# Patient Record
Sex: FEMALE | Race: WHITE | Employment: OTHER | URBAN - METROPOLITAN AREA
[De-identification: names, ages, dates, MRNs, and addresses within clinical notes are randomized per-mention and may not be internally consistent; named-entity substitution may affect disease eponyms.]

---

## 2017-01-01 ENCOUNTER — PATIENT OUTREACH (OUTPATIENT)
Dept: CASE MANAGEMENT | Age: 82
End: 2017-01-01

## 2017-01-01 ENCOUNTER — APPOINTMENT (OUTPATIENT)
Dept: GENERAL RADIOLOGY | Age: 82
End: 2017-01-01
Attending: EMERGENCY MEDICINE
Payer: MEDICARE

## 2017-01-01 ENCOUNTER — HOSPITAL ENCOUNTER (EMERGENCY)
Age: 82
Discharge: HOME | End: 2017-01-01
Attending: EMERGENCY MEDICINE
Payer: MEDICARE

## 2017-01-01 ENCOUNTER — APPOINTMENT (OUTPATIENT)
Dept: GENERAL RADIOLOGY | Age: 82
DRG: 189 | End: 2017-01-01
Attending: INTERNAL MEDICINE
Payer: MEDICARE

## 2017-01-01 ENCOUNTER — HOSPITAL ENCOUNTER (INPATIENT)
Age: 82
LOS: 4 days | Discharge: NURSING, REHAB OR LONG-TERM CARE | DRG: 189 | End: 2017-01-14
Attending: INTERNAL MEDICINE | Admitting: INTERNAL MEDICINE
Payer: MEDICARE

## 2017-01-01 VITALS
HEIGHT: 68 IN | OXYGEN SATURATION: 100 % | SYSTOLIC BLOOD PRESSURE: 126 MMHG | HEART RATE: 90 BPM | BODY MASS INDEX: 17.43 KG/M2 | DIASTOLIC BLOOD PRESSURE: 56 MMHG | TEMPERATURE: 97.5 F | RESPIRATION RATE: 20 BRPM | WEIGHT: 115 LBS

## 2017-01-01 VITALS
WEIGHT: 117.9 LBS | BODY MASS INDEX: 19.03 KG/M2 | TEMPERATURE: 98.6 F | RESPIRATION RATE: 18 BRPM | HEART RATE: 93 BPM | OXYGEN SATURATION: 97 % | SYSTOLIC BLOOD PRESSURE: 127 MMHG | DIASTOLIC BLOOD PRESSURE: 66 MMHG

## 2017-01-01 DIAGNOSIS — J43.9 PULMONARY EMPHYSEMA, UNSPECIFIED EMPHYSEMA TYPE (HCC): ICD-10-CM

## 2017-01-01 DIAGNOSIS — R09.02 HYPOXIA: ICD-10-CM

## 2017-01-01 DIAGNOSIS — R06.02 SOB (SHORTNESS OF BREATH): Primary | ICD-10-CM

## 2017-01-01 DIAGNOSIS — J96.21 ACUTE ON CHRONIC RESPIRATORY FAILURE WITH HYPOXEMIA (HCC): ICD-10-CM

## 2017-01-01 DIAGNOSIS — R06.02 SHORTNESS OF BREATH: ICD-10-CM

## 2017-01-01 DIAGNOSIS — F32.A DEPRESSION, UNSPECIFIED DEPRESSION TYPE: Chronic | ICD-10-CM

## 2017-01-01 DIAGNOSIS — J44.1 COPD EXACERBATION (HCC): ICD-10-CM

## 2017-01-01 DIAGNOSIS — R53.81 DEBILITY: ICD-10-CM

## 2017-01-01 DIAGNOSIS — Z87.891 PERSONAL HISTORY OF TOBACCO USE: Chronic | ICD-10-CM

## 2017-01-01 DIAGNOSIS — G47.00 INSOMNIA, UNSPECIFIED TYPE: ICD-10-CM

## 2017-01-01 LAB
ALBUMIN SERPL BCP-MCNC: 3.5 G/DL (ref 3.2–4.6)
ALBUMIN/GLOB SERPL: 0.9 {RATIO} (ref 1.2–3.5)
ALP SERPL-CCNC: 67 U/L (ref 50–136)
ALT SERPL-CCNC: 26 U/L (ref 12–65)
ANION GAP BLD CALC-SCNC: 5 MMOL/L (ref 7–16)
ANION GAP BLD CALC-SCNC: 6 MMOL/L (ref 7–16)
ANION GAP BLD CALC-SCNC: 8 MMOL/L (ref 7–16)
ANION GAP BLD CALC-SCNC: 8 MMOL/L (ref 7–16)
ARTERIAL PATENCY WRIST A: POSITIVE
AST SERPL W P-5'-P-CCNC: 22 U/L (ref 15–37)
ATRIAL RATE: 70 BPM
BACTERIA SPEC CULT: NORMAL
BASE EXCESS BLDA CALC-SCNC: 5.7 MMOL/L (ref 0–3)
BASOPHILS # BLD AUTO: 0 K/UL (ref 0–0.2)
BASOPHILS # BLD AUTO: 0 K/UL (ref 0–0.2)
BASOPHILS # BLD: 0 % (ref 0–2)
BASOPHILS # BLD: 0 % (ref 0–2)
BDY SITE: ABNORMAL
BILIRUB SERPL-MCNC: 0.3 MG/DL (ref 0.2–1.1)
BNP SERPL-MCNC: 112 PG/ML
BUN SERPL-MCNC: 19 MG/DL (ref 8–23)
BUN SERPL-MCNC: 19 MG/DL (ref 8–23)
BUN SERPL-MCNC: 21 MG/DL (ref 8–23)
BUN SERPL-MCNC: 31 MG/DL (ref 8–23)
CALCIUM SERPL-MCNC: 8.9 MG/DL (ref 8.3–10.4)
CALCIUM SERPL-MCNC: 9.1 MG/DL (ref 8.3–10.4)
CALCIUM SERPL-MCNC: 9.2 MG/DL (ref 8.3–10.4)
CALCIUM SERPL-MCNC: 9.6 MG/DL (ref 8.3–10.4)
CALCULATED P AXIS, ECG09: 47 DEGREES
CALCULATED R AXIS, ECG10: 82 DEGREES
CALCULATED T AXIS, ECG11: 73 DEGREES
CHLORIDE SERPL-SCNC: 105 MMOL/L (ref 98–107)
CHLORIDE SERPL-SCNC: 95 MMOL/L (ref 98–107)
CHLORIDE SERPL-SCNC: 98 MMOL/L (ref 98–107)
CHLORIDE SERPL-SCNC: 98 MMOL/L (ref 98–107)
CO2 SERPL-SCNC: 36 MMOL/L (ref 21–32)
CO2 SERPL-SCNC: 36 MMOL/L (ref 21–32)
CO2 SERPL-SCNC: 40 MMOL/L (ref 21–32)
CO2 SERPL-SCNC: 40 MMOL/L (ref 21–32)
COHGB MFR BLD: 0.3 % (ref 0.5–1.5)
CREAT SERPL-MCNC: 0.81 MG/DL (ref 0.6–1)
CREAT SERPL-MCNC: 0.88 MG/DL (ref 0.6–1)
CREAT SERPL-MCNC: 1.14 MG/DL (ref 0.6–1)
CREAT SERPL-MCNC: 1.15 MG/DL (ref 0.6–1)
D DIMER PPP FEU-MCNC: 0.51 UG/ML(FEU)
DIAGNOSIS, 93000: NORMAL
DIASTOLIC BP, ECG02: NORMAL MMHG
DIFFERENTIAL METHOD BLD: ABNORMAL
DIFFERENTIAL METHOD BLD: ABNORMAL
DO-HGB BLD-MCNC: 3 % (ref 0–5)
EOSINOPHIL # BLD: 0 K/UL (ref 0–0.8)
EOSINOPHIL # BLD: 0 K/UL (ref 0–0.8)
EOSINOPHIL NFR BLD: 0 % (ref 0.5–7.8)
EOSINOPHIL NFR BLD: 0 % (ref 0.5–7.8)
ERYTHROCYTE [DISTWIDTH] IN BLOOD BY AUTOMATED COUNT: 11.8 % (ref 11.9–14.6)
ERYTHROCYTE [DISTWIDTH] IN BLOOD BY AUTOMATED COUNT: 12.4 % (ref 11.9–14.6)
FLUAV AG NPH QL IA: NEGATIVE
FLUBV AG NPH QL IA: NEGATIVE
GAS FLOW.O2 O2 DELIVERY SYS: 2 L/MIN
GLOBULIN SER CALC-MCNC: 3.7 G/DL (ref 2.3–3.5)
GLUCOSE SERPL-MCNC: 118 MG/DL (ref 65–100)
GLUCOSE SERPL-MCNC: 122 MG/DL (ref 65–100)
GLUCOSE SERPL-MCNC: 123 MG/DL (ref 65–100)
GLUCOSE SERPL-MCNC: 137 MG/DL (ref 65–100)
HCO3 BLDA-SCNC: 32 MMOL/L (ref 22–26)
HCT VFR BLD AUTO: 39.7 % (ref 35.8–46.3)
HCT VFR BLD AUTO: 42.8 % (ref 35.8–46.3)
HGB BLD-MCNC: 13.1 G/DL (ref 11.7–15.4)
HGB BLD-MCNC: 14.4 G/DL (ref 11.7–15.4)
HGB BLDMV-MCNC: 12.3 GM/DL (ref 11.7–15)
IMM GRANULOCYTES # BLD: 0 K/UL (ref 0–0.5)
IMM GRANULOCYTES # BLD: 0 K/UL (ref 0–0.5)
IMM GRANULOCYTES NFR BLD AUTO: 0.2 % (ref 0–5)
IMM GRANULOCYTES NFR BLD AUTO: 0.2 % (ref 0–5)
LYMPHOCYTES # BLD AUTO: 11 % (ref 13–44)
LYMPHOCYTES # BLD AUTO: 8 % (ref 13–44)
LYMPHOCYTES # BLD: 0.6 K/UL (ref 0.5–4.6)
LYMPHOCYTES # BLD: 1 K/UL (ref 0.5–4.6)
MAGNESIUM SERPL-MCNC: 2.1 MG/DL (ref 1.8–2.4)
MAGNESIUM SERPL-MCNC: 2.3 MG/DL (ref 1.8–2.4)
MCH RBC QN AUTO: 34 PG (ref 26.1–32.9)
MCH RBC QN AUTO: 34.2 PG (ref 26.1–32.9)
MCHC RBC AUTO-ENTMCNC: 33 G/DL (ref 31.4–35)
MCHC RBC AUTO-ENTMCNC: 33.6 G/DL (ref 31.4–35)
MCV RBC AUTO: 100.9 FL (ref 79.6–97.8)
MCV RBC AUTO: 103.7 FL (ref 79.6–97.8)
METHGB MFR BLD: 0.3 % (ref 0–1.5)
MM INDURATION POC: NORMAL MM (ref 0–5)
MONOCYTES # BLD: 0.4 K/UL (ref 0.1–1.3)
MONOCYTES # BLD: 0.5 K/UL (ref 0.1–1.3)
MONOCYTES NFR BLD AUTO: 4 % (ref 4–12)
MONOCYTES NFR BLD AUTO: 6 % (ref 4–12)
NEUTS SEG # BLD: 7.2 K/UL (ref 1.7–8.2)
NEUTS SEG # BLD: 7.7 K/UL (ref 1.7–8.2)
NEUTS SEG NFR BLD AUTO: 85 % (ref 43–78)
NEUTS SEG NFR BLD AUTO: 86 % (ref 43–78)
OXYHGB MFR BLDA: 96.7 % (ref 94–97)
P-R INTERVAL, ECG05: 174 MS
PCO2 BLDA: 52 MMHG (ref 35–45)
PH BLDA: 7.41 [PH] (ref 7.35–7.45)
PLATELET # BLD AUTO: 211 K/UL (ref 150–450)
PLATELET # BLD AUTO: 240 K/UL (ref 150–450)
PMV BLD AUTO: 10 FL (ref 10.8–14.1)
PMV BLD AUTO: 10.1 FL (ref 10.8–14.1)
PO2 BLDA: 107 MMHG (ref 75–100)
POTASSIUM SERPL-SCNC: 3 MMOL/L (ref 3.5–5.1)
POTASSIUM SERPL-SCNC: 3.1 MMOL/L (ref 3.5–5.1)
POTASSIUM SERPL-SCNC: 3.6 MMOL/L (ref 3.5–5.1)
POTASSIUM SERPL-SCNC: 3.7 MMOL/L (ref 3.5–5.1)
PPD POC: NEGATIVE NEGATIVE
PROT SERPL-MCNC: 7.2 G/DL (ref 6.3–8.2)
Q-T INTERVAL, ECG07: 420 MS
QRS DURATION, ECG06: 76 MS
QTC CALCULATION (BEZET), ECG08: 453 MS
RBC # BLD AUTO: 3.83 M/UL (ref 4.05–5.25)
RBC # BLD AUTO: 4.24 M/UL (ref 4.05–5.25)
SAO2 % BLD: 97 % (ref 92–98.5)
SERVICE CMNT-IMP: NORMAL
SODIUM SERPL-SCNC: 142 MMOL/L (ref 136–145)
SODIUM SERPL-SCNC: 143 MMOL/L (ref 136–145)
SODIUM SERPL-SCNC: 143 MMOL/L (ref 136–145)
SODIUM SERPL-SCNC: 147 MMOL/L (ref 136–145)
SYSTOLIC BP, ECG01: NORMAL MMHG
TROPONIN I SERPL-MCNC: <0.02 NG/ML (ref 0.02–0.05)
VENTILATION MODE VENT: ABNORMAL
VENTRICULAR RATE, ECG03: 70 BPM
WBC # BLD AUTO: 8.5 K/UL (ref 4.3–11.1)
WBC # BLD AUTO: 9.2 K/UL (ref 4.3–11.1)

## 2017-01-01 PROCEDURE — 94640 AIRWAY INHALATION TREATMENT: CPT

## 2017-01-01 PROCEDURE — 83880 ASSAY OF NATRIURETIC PEPTIDE: CPT | Performed by: EMERGENCY MEDICINE

## 2017-01-01 PROCEDURE — 74011250637 HC RX REV CODE- 250/637: Performed by: INTERNAL MEDICINE

## 2017-01-01 PROCEDURE — 77010033711 HC HIGH FLOW OXYGEN

## 2017-01-01 PROCEDURE — 71020 XR CHEST PA LAT: CPT

## 2017-01-01 PROCEDURE — 94760 N-INVAS EAR/PLS OXIMETRY 1: CPT

## 2017-01-01 PROCEDURE — 93005 ELECTROCARDIOGRAM TRACING: CPT | Performed by: EMERGENCY MEDICINE

## 2017-01-01 PROCEDURE — 74011000250 HC RX REV CODE- 250: Performed by: INTERNAL MEDICINE

## 2017-01-01 PROCEDURE — 97166 OT EVAL MOD COMPLEX 45 MIN: CPT

## 2017-01-01 PROCEDURE — 99239 HOSP IP/OBS DSCHRG MGMT >30: CPT | Performed by: INTERNAL MEDICINE

## 2017-01-01 PROCEDURE — 86580 TB INTRADERMAL TEST: CPT | Performed by: INTERNAL MEDICINE

## 2017-01-01 PROCEDURE — 85025 COMPLETE CBC W/AUTO DIFF WBC: CPT | Performed by: EMERGENCY MEDICINE

## 2017-01-01 PROCEDURE — 87804 INFLUENZA ASSAY W/OPTIC: CPT | Performed by: EMERGENCY MEDICINE

## 2017-01-01 PROCEDURE — 77010033678 HC OXYGEN DAILY

## 2017-01-01 PROCEDURE — 74011250636 HC RX REV CODE- 250/636: Performed by: INTERNAL MEDICINE

## 2017-01-01 PROCEDURE — 99232 SBSQ HOSP IP/OBS MODERATE 35: CPT | Performed by: INTERNAL MEDICINE

## 2017-01-01 PROCEDURE — 74011250636 HC RX REV CODE- 250/636: Performed by: NURSE PRACTITIONER

## 2017-01-01 PROCEDURE — 80048 BASIC METABOLIC PNL TOTAL CA: CPT | Performed by: INTERNAL MEDICINE

## 2017-01-01 PROCEDURE — 97535 SELF CARE MNGMENT TRAINING: CPT

## 2017-01-01 PROCEDURE — 77030021668 HC NEB PREFIL KT VYRM -A

## 2017-01-01 PROCEDURE — 74011636637 HC RX REV CODE- 636/637: Performed by: INTERNAL MEDICINE

## 2017-01-01 PROCEDURE — 85025 COMPLETE CBC W/AUTO DIFF WBC: CPT | Performed by: INTERNAL MEDICINE

## 2017-01-01 PROCEDURE — 36415 COLL VENOUS BLD VENIPUNCTURE: CPT | Performed by: INTERNAL MEDICINE

## 2017-01-01 PROCEDURE — 65270000029 HC RM PRIVATE

## 2017-01-01 PROCEDURE — 87040 BLOOD CULTURE FOR BACTERIA: CPT | Performed by: INTERNAL MEDICINE

## 2017-01-01 PROCEDURE — 97162 PT EVAL MOD COMPLEX 30 MIN: CPT

## 2017-01-01 PROCEDURE — 74011000250 HC RX REV CODE- 250: Performed by: EMERGENCY MEDICINE

## 2017-01-01 PROCEDURE — 99223 1ST HOSP IP/OBS HIGH 75: CPT | Performed by: INTERNAL MEDICINE

## 2017-01-01 PROCEDURE — 71010 XR CHEST PORT: CPT

## 2017-01-01 PROCEDURE — 74011000302 HC RX REV CODE- 302: Performed by: INTERNAL MEDICINE

## 2017-01-01 PROCEDURE — 83735 ASSAY OF MAGNESIUM: CPT | Performed by: INTERNAL MEDICINE

## 2017-01-01 PROCEDURE — 74011000258 HC RX REV CODE- 258: Performed by: INTERNAL MEDICINE

## 2017-01-01 PROCEDURE — 85379 FIBRIN DEGRADATION QUANT: CPT | Performed by: EMERGENCY MEDICINE

## 2017-01-01 PROCEDURE — 99285 EMERGENCY DEPT VISIT HI MDM: CPT | Performed by: EMERGENCY MEDICINE

## 2017-01-01 PROCEDURE — 82803 BLOOD GASES ANY COMBINATION: CPT

## 2017-01-01 PROCEDURE — 74011250637 HC RX REV CODE- 250/637: Performed by: NURSE PRACTITIONER

## 2017-01-01 PROCEDURE — 36600 WITHDRAWAL OF ARTERIAL BLOOD: CPT

## 2017-01-01 PROCEDURE — 84484 ASSAY OF TROPONIN QUANT: CPT | Performed by: EMERGENCY MEDICINE

## 2017-01-01 PROCEDURE — 97110 THERAPEUTIC EXERCISES: CPT

## 2017-01-01 PROCEDURE — 80053 COMPREHEN METABOLIC PANEL: CPT | Performed by: EMERGENCY MEDICINE

## 2017-01-01 RX ORDER — IPRATROPIUM BROMIDE AND ALBUTEROL SULFATE 2.5; .5 MG/3ML; MG/3ML
3 SOLUTION RESPIRATORY (INHALATION)
Status: COMPLETED | OUTPATIENT
Start: 2017-01-01 | End: 2017-01-01

## 2017-01-01 RX ORDER — TRAZODONE HYDROCHLORIDE 50 MG/1
50 TABLET ORAL
Status: DISCONTINUED | OUTPATIENT
Start: 2017-01-01 | End: 2017-01-01 | Stop reason: HOSPADM

## 2017-01-01 RX ORDER — LATANOPROST 50 UG/ML
1 SOLUTION/ DROPS OPHTHALMIC
Status: DISCONTINUED | OUTPATIENT
Start: 2017-01-01 | End: 2017-01-01 | Stop reason: HOSPADM

## 2017-01-01 RX ORDER — FACIAL-BODY WIPES
10 EACH TOPICAL DAILY PRN
Status: DISCONTINUED | OUTPATIENT
Start: 2017-01-01 | End: 2017-01-01 | Stop reason: HOSPADM

## 2017-01-01 RX ORDER — POTASSIUM CHLORIDE 20 MEQ/1
40 TABLET, EXTENDED RELEASE ORAL
Status: COMPLETED | OUTPATIENT
Start: 2017-01-01 | End: 2017-01-01

## 2017-01-01 RX ORDER — LISINOPRIL 20 MG/1
40 TABLET ORAL DAILY
Status: DISCONTINUED | OUTPATIENT
Start: 2017-01-01 | End: 2017-01-01 | Stop reason: HOSPADM

## 2017-01-01 RX ORDER — DILTIAZEM HYDROCHLORIDE 120 MG/1
240 CAPSULE, COATED, EXTENDED RELEASE ORAL DAILY
Status: DISCONTINUED | OUTPATIENT
Start: 2017-01-01 | End: 2017-01-01 | Stop reason: HOSPADM

## 2017-01-01 RX ORDER — TEMAZEPAM 15 MG/1
15 CAPSULE ORAL
Status: DISCONTINUED | OUTPATIENT
Start: 2017-01-01 | End: 2017-01-01 | Stop reason: HOSPADM

## 2017-01-01 RX ORDER — BISACODYL 5 MG
5 TABLET, DELAYED RELEASE (ENTERIC COATED) ORAL DAILY PRN
Status: DISCONTINUED | OUTPATIENT
Start: 2017-01-01 | End: 2017-01-01 | Stop reason: HOSPADM

## 2017-01-01 RX ORDER — HYDROCHLOROTHIAZIDE 25 MG/1
25 TABLET ORAL DAILY
Status: DISCONTINUED | OUTPATIENT
Start: 2017-01-01 | End: 2017-01-01 | Stop reason: HOSPADM

## 2017-01-01 RX ORDER — SODIUM CHLORIDE 0.9 % (FLUSH) 0.9 %
5-10 SYRINGE (ML) INJECTION EVERY 8 HOURS
Status: DISCONTINUED | OUTPATIENT
Start: 2017-01-01 | End: 2017-01-01 | Stop reason: HOSPADM

## 2017-01-01 RX ORDER — LEVALBUTEROL INHALATION SOLUTION 0.63 MG/3ML
0.63 SOLUTION RESPIRATORY (INHALATION)
Status: DISCONTINUED | OUTPATIENT
Start: 2017-01-01 | End: 2017-01-01 | Stop reason: HOSPADM

## 2017-01-01 RX ORDER — PREDNISONE 20 MG/1
TABLET ORAL
Qty: 15 TAB | Refills: 0 | Status: SHIPPED | OUTPATIENT
Start: 2017-01-01

## 2017-01-01 RX ORDER — LEVOFLOXACIN 750 MG/1
750 TABLET ORAL
Qty: 1 TAB | Refills: 0 | Status: SHIPPED | OUTPATIENT
Start: 2017-01-01

## 2017-01-01 RX ORDER — LEVOFLOXACIN 5 MG/ML
750 INJECTION, SOLUTION INTRAVENOUS
Status: DISCONTINUED | OUTPATIENT
Start: 2017-01-01 | End: 2017-01-01

## 2017-01-01 RX ORDER — BUPROPION HYDROCHLORIDE 75 MG/1
75 TABLET ORAL DAILY
Status: DISCONTINUED | OUTPATIENT
Start: 2017-01-01 | End: 2017-01-01 | Stop reason: HOSPADM

## 2017-01-01 RX ORDER — IPRATROPIUM BROMIDE 0.5 MG/2.5ML
0.5 SOLUTION RESPIRATORY (INHALATION) AS NEEDED
Qty: 825 ML | Refills: 0 | Status: SHIPPED
Start: 2017-01-01

## 2017-01-01 RX ORDER — VENLAFAXINE 75 MG/1
37.5 TABLET ORAL 2 TIMES DAILY
Status: DISCONTINUED | OUTPATIENT
Start: 2017-01-01 | End: 2017-01-01 | Stop reason: HOSPADM

## 2017-01-01 RX ORDER — IPRATROPIUM BROMIDE 0.5 MG/2.5ML
0.5 SOLUTION RESPIRATORY (INHALATION)
Status: DISCONTINUED | OUTPATIENT
Start: 2017-01-01 | End: 2017-01-01 | Stop reason: HOSPADM

## 2017-01-01 RX ORDER — PREDNISONE 20 MG/1
40 TABLET ORAL
Status: DISCONTINUED | OUTPATIENT
Start: 2017-01-01 | End: 2017-01-01 | Stop reason: HOSPADM

## 2017-01-01 RX ORDER — ENOXAPARIN SODIUM 100 MG/ML
30 INJECTION SUBCUTANEOUS EVERY 24 HOURS
Status: DISCONTINUED | OUTPATIENT
Start: 2017-01-01 | End: 2017-01-01 | Stop reason: HOSPADM

## 2017-01-01 RX ORDER — DEXTROSE MONOHYDRATE AND SODIUM CHLORIDE 5; .9 G/100ML; G/100ML
50 INJECTION, SOLUTION INTRAVENOUS CONTINUOUS
Status: DISCONTINUED | OUTPATIENT
Start: 2017-01-01 | End: 2017-01-01

## 2017-01-01 RX ORDER — BUDESONIDE 0.5 MG/2ML
500 INHALANT ORAL
Status: DISCONTINUED | OUTPATIENT
Start: 2017-01-01 | End: 2017-01-01 | Stop reason: HOSPADM

## 2017-01-01 RX ORDER — RALOXIFENE HYDROCHLORIDE 60 MG/1
60 TABLET, FILM COATED ORAL DAILY
Status: DISCONTINUED | OUTPATIENT
Start: 2017-01-01 | End: 2017-01-01 | Stop reason: HOSPADM

## 2017-01-01 RX ORDER — MORPHINE SULFATE 100 MG/5ML
3 SOLUTION ORAL ONCE
Status: COMPLETED | OUTPATIENT
Start: 2017-01-01 | End: 2017-01-01

## 2017-01-01 RX ORDER — SODIUM CHLORIDE 0.9 % (FLUSH) 0.9 %
5-10 SYRINGE (ML) INJECTION AS NEEDED
Status: DISCONTINUED | OUTPATIENT
Start: 2017-01-01 | End: 2017-01-01 | Stop reason: HOSPADM

## 2017-01-01 RX ADMIN — BUDESONIDE 500 MCG: 0.5 INHALANT RESPIRATORY (INHALATION) at 08:09

## 2017-01-01 RX ADMIN — LEVALBUTEROL HYDROCHLORIDE 0.63 MG: 0.63 SOLUTION RESPIRATORY (INHALATION) at 11:36

## 2017-01-01 RX ADMIN — IPRATROPIUM BROMIDE 0.5 MG: 0.5 SOLUTION RESPIRATORY (INHALATION) at 11:36

## 2017-01-01 RX ADMIN — DILTIAZEM HYDROCHLORIDE 240 MG: 120 CAPSULE, COATED, EXTENDED RELEASE ORAL at 08:41

## 2017-01-01 RX ADMIN — VENLAFAXINE 37.5 MG: 75 TABLET ORAL at 08:43

## 2017-01-01 RX ADMIN — LEVALBUTEROL HYDROCHLORIDE 0.63 MG: 0.63 SOLUTION RESPIRATORY (INHALATION) at 00:33

## 2017-01-01 RX ADMIN — IPRATROPIUM BROMIDE AND ALBUTEROL SULFATE 3 ML: .5; 3 SOLUTION RESPIRATORY (INHALATION) at 20:00

## 2017-01-01 RX ADMIN — IPRATROPIUM BROMIDE 0.5 MG: 0.5 SOLUTION RESPIRATORY (INHALATION) at 00:33

## 2017-01-01 RX ADMIN — METHYLPREDNISOLONE SODIUM SUCCINATE 40 MG: 40 INJECTION, POWDER, FOR SOLUTION INTRAMUSCULAR; INTRAVENOUS at 06:04

## 2017-01-01 RX ADMIN — LATANOPROST 1 DROP: 50 SOLUTION OPHTHALMIC at 20:11

## 2017-01-01 RX ADMIN — IPRATROPIUM BROMIDE 0.5 MG: 0.5 SOLUTION RESPIRATORY (INHALATION) at 15:11

## 2017-01-01 RX ADMIN — Medication 10 ML: at 17:24

## 2017-01-01 RX ADMIN — LEVALBUTEROL HYDROCHLORIDE 0.63 MG: 0.63 SOLUTION RESPIRATORY (INHALATION) at 14:13

## 2017-01-01 RX ADMIN — IPRATROPIUM BROMIDE 0.5 MG: 0.5 SOLUTION RESPIRATORY (INHALATION) at 19:36

## 2017-01-01 RX ADMIN — HYDROCHLOROTHIAZIDE 25 MG: 25 TABLET ORAL at 08:42

## 2017-01-01 RX ADMIN — MULTIPLE VITAMINS W/ MINERALS TAB 1 TABLET: TAB at 09:30

## 2017-01-01 RX ADMIN — TUBERCULIN PURIFIED PROTEIN DERIVATIVE 5 UNITS: 5 INJECTION INTRADERMAL at 14:36

## 2017-01-01 RX ADMIN — MULTIPLE VITAMINS W/ MINERALS TAB 1 TABLET: TAB at 08:42

## 2017-01-01 RX ADMIN — LEVALBUTEROL HYDROCHLORIDE 0.63 MG: 0.63 SOLUTION RESPIRATORY (INHALATION) at 08:09

## 2017-01-01 RX ADMIN — METHYLPREDNISOLONE SODIUM SUCCINATE 40 MG: 40 INJECTION, POWDER, FOR SOLUTION INTRAMUSCULAR; INTRAVENOUS at 18:12

## 2017-01-01 RX ADMIN — Medication 10 ML: at 06:05

## 2017-01-01 RX ADMIN — LEVALBUTEROL HYDROCHLORIDE 0.63 MG: 0.63 SOLUTION RESPIRATORY (INHALATION) at 19:26

## 2017-01-01 RX ADMIN — HYDROCHLOROTHIAZIDE 25 MG: 25 TABLET ORAL at 09:29

## 2017-01-01 RX ADMIN — LISINOPRIL 40 MG: 20 TABLET ORAL at 08:42

## 2017-01-01 RX ADMIN — LEVALBUTEROL HYDROCHLORIDE 0.63 MG: 0.63 SOLUTION RESPIRATORY (INHALATION) at 20:42

## 2017-01-01 RX ADMIN — TRAZODONE HYDROCHLORIDE 50 MG: 50 TABLET ORAL at 21:00

## 2017-01-01 RX ADMIN — Medication 5 ML: at 22:31

## 2017-01-01 RX ADMIN — LATANOPROST 1 DROP: 50 SOLUTION OPHTHALMIC at 21:18

## 2017-01-01 RX ADMIN — BUDESONIDE 500 MCG: 0.5 INHALANT RESPIRATORY (INHALATION) at 19:36

## 2017-01-01 RX ADMIN — GUAIFENESIN 600 MG: 600 TABLET, EXTENDED RELEASE ORAL at 07:50

## 2017-01-01 RX ADMIN — METHYLPREDNISOLONE SODIUM SUCCINATE 40 MG: 40 INJECTION, POWDER, FOR SOLUTION INTRAMUSCULAR; INTRAVENOUS at 17:18

## 2017-01-01 RX ADMIN — BISACODYL 10 MG: 10 SUPPOSITORY RECTAL at 14:29

## 2017-01-01 RX ADMIN — LEVALBUTEROL HYDROCHLORIDE 0.63 MG: 0.63 SOLUTION RESPIRATORY (INHALATION) at 23:13

## 2017-01-01 RX ADMIN — LEVALBUTEROL HYDROCHLORIDE 0.63 MG: 0.63 SOLUTION RESPIRATORY (INHALATION) at 15:11

## 2017-01-01 RX ADMIN — POTASSIUM CHLORIDE 40 MEQ: 20 TABLET, EXTENDED RELEASE ORAL at 08:42

## 2017-01-01 RX ADMIN — VENLAFAXINE 37.5 MG: 75 TABLET ORAL at 17:28

## 2017-01-01 RX ADMIN — IPRATROPIUM BROMIDE 0.5 MG: 0.5 SOLUTION RESPIRATORY (INHALATION) at 03:53

## 2017-01-01 RX ADMIN — GUAIFENESIN 600 MG: 600 TABLET, EXTENDED RELEASE ORAL at 09:30

## 2017-01-01 RX ADMIN — ENOXAPARIN SODIUM 30 MG: 30 INJECTION SUBCUTANEOUS at 17:29

## 2017-01-01 RX ADMIN — LEVALBUTEROL HYDROCHLORIDE 0.63 MG: 0.63 SOLUTION RESPIRATORY (INHALATION) at 07:18

## 2017-01-01 RX ADMIN — LEVALBUTEROL HYDROCHLORIDE 0.63 MG: 0.63 SOLUTION RESPIRATORY (INHALATION) at 04:05

## 2017-01-01 RX ADMIN — Medication 10 ML: at 05:48

## 2017-01-01 RX ADMIN — DILTIAZEM HYDROCHLORIDE 240 MG: 120 CAPSULE, COATED, EXTENDED RELEASE ORAL at 08:42

## 2017-01-01 RX ADMIN — BUDESONIDE 500 MCG: 0.5 INHALANT RESPIRATORY (INHALATION) at 07:18

## 2017-01-01 RX ADMIN — IPRATROPIUM BROMIDE 0.5 MG: 0.5 SOLUTION RESPIRATORY (INHALATION) at 14:13

## 2017-01-01 RX ADMIN — VENLAFAXINE 37.5 MG: 75 TABLET ORAL at 17:19

## 2017-01-01 RX ADMIN — BUDESONIDE 500 MCG: 0.5 INHALANT RESPIRATORY (INHALATION) at 07:57

## 2017-01-01 RX ADMIN — METHYLPREDNISOLONE SODIUM SUCCINATE 80 MG: 125 INJECTION, POWDER, FOR SOLUTION INTRAMUSCULAR; INTRAVENOUS at 06:34

## 2017-01-01 RX ADMIN — VENLAFAXINE 37.5 MG: 75 TABLET ORAL at 18:09

## 2017-01-01 RX ADMIN — LEVOFLOXACIN 750 MG: 5 INJECTION, SOLUTION INTRAVENOUS at 17:18

## 2017-01-01 RX ADMIN — Medication 5 ML: at 12:35

## 2017-01-01 RX ADMIN — LATANOPROST 1 DROP: 50 SOLUTION OPHTHALMIC at 22:31

## 2017-01-01 RX ADMIN — VENLAFAXINE 37.5 MG: 75 TABLET ORAL at 09:31

## 2017-01-01 RX ADMIN — DILTIAZEM HYDROCHLORIDE 240 MG: 120 CAPSULE, COATED, EXTENDED RELEASE ORAL at 07:49

## 2017-01-01 RX ADMIN — ENOXAPARIN SODIUM 30 MG: 30 INJECTION SUBCUTANEOUS at 17:28

## 2017-01-01 RX ADMIN — GUAIFENESIN 600 MG: 600 TABLET, EXTENDED RELEASE ORAL at 08:41

## 2017-01-01 RX ADMIN — TRAZODONE HYDROCHLORIDE 50 MG: 50 TABLET ORAL at 21:02

## 2017-01-01 RX ADMIN — IPRATROPIUM BROMIDE 0.5 MG: 0.5 SOLUTION RESPIRATORY (INHALATION) at 23:13

## 2017-01-01 RX ADMIN — LEVALBUTEROL HYDROCHLORIDE 0.63 MG: 0.63 SOLUTION RESPIRATORY (INHALATION) at 19:30

## 2017-01-01 RX ADMIN — MORPHINE SULFATE 3 MG: 100 SOLUTION ORAL at 13:36

## 2017-01-01 RX ADMIN — GUAIFENESIN 600 MG: 600 TABLET, EXTENDED RELEASE ORAL at 18:09

## 2017-01-01 RX ADMIN — IPRATROPIUM BROMIDE 0.5 MG: 0.5 SOLUTION RESPIRATORY (INHALATION) at 10:59

## 2017-01-01 RX ADMIN — IPRATROPIUM BROMIDE 0.5 MG: 0.5 SOLUTION RESPIRATORY (INHALATION) at 07:57

## 2017-01-01 RX ADMIN — LEVALBUTEROL HYDROCHLORIDE 0.63 MG: 0.63 SOLUTION RESPIRATORY (INHALATION) at 17:34

## 2017-01-01 RX ADMIN — LEVALBUTEROL HYDROCHLORIDE 0.63 MG: 0.63 SOLUTION RESPIRATORY (INHALATION) at 23:20

## 2017-01-01 RX ADMIN — Medication 10 ML: at 17:29

## 2017-01-01 RX ADMIN — BUPROPION HYDROCHLORIDE 75 MG: 75 TABLET, FILM COATED ORAL at 08:42

## 2017-01-01 RX ADMIN — METHYLPREDNISOLONE SODIUM SUCCINATE 40 MG: 40 INJECTION, POWDER, FOR SOLUTION INTRAMUSCULAR; INTRAVENOUS at 06:19

## 2017-01-01 RX ADMIN — IPRATROPIUM BROMIDE 0.5 MG: 0.5 SOLUTION RESPIRATORY (INHALATION) at 04:05

## 2017-01-01 RX ADMIN — LATANOPROST 1 DROP: 50 SOLUTION OPHTHALMIC at 21:02

## 2017-01-01 RX ADMIN — Medication 10 ML: at 20:12

## 2017-01-01 RX ADMIN — LEVALBUTEROL HYDROCHLORIDE 0.63 MG: 0.63 SOLUTION RESPIRATORY (INHALATION) at 19:36

## 2017-01-01 RX ADMIN — LEVALBUTEROL HYDROCHLORIDE 0.63 MG: 0.63 SOLUTION RESPIRATORY (INHALATION) at 10:59

## 2017-01-01 RX ADMIN — LEVALBUTEROL HYDROCHLORIDE 0.63 MG: 0.63 SOLUTION RESPIRATORY (INHALATION) at 04:46

## 2017-01-01 RX ADMIN — LEVALBUTEROL HYDROCHLORIDE 0.63 MG: 0.63 SOLUTION RESPIRATORY (INHALATION) at 23:48

## 2017-01-01 RX ADMIN — LEVALBUTEROL HYDROCHLORIDE 0.63 MG: 0.63 SOLUTION RESPIRATORY (INHALATION) at 07:11

## 2017-01-01 RX ADMIN — IPRATROPIUM BROMIDE 0.5 MG: 0.5 SOLUTION RESPIRATORY (INHALATION) at 08:09

## 2017-01-01 RX ADMIN — ENOXAPARIN SODIUM 30 MG: 30 INJECTION SUBCUTANEOUS at 18:09

## 2017-01-01 RX ADMIN — BISACODYL 5 MG: 5 TABLET, COATED ORAL at 14:29

## 2017-01-01 RX ADMIN — GUAIFENESIN 600 MG: 600 TABLET, EXTENDED RELEASE ORAL at 17:28

## 2017-01-01 RX ADMIN — Medication 5 ML: at 18:16

## 2017-01-01 RX ADMIN — DEXTROSE MONOHYDRATE AND SODIUM CHLORIDE 50 ML/HR: 5; .9 INJECTION, SOLUTION INTRAVENOUS at 18:09

## 2017-01-01 RX ADMIN — IPRATROPIUM BROMIDE 0.5 MG: 0.5 SOLUTION RESPIRATORY (INHALATION) at 04:46

## 2017-01-01 RX ADMIN — HYDROCHLOROTHIAZIDE 25 MG: 25 TABLET ORAL at 08:41

## 2017-01-01 RX ADMIN — HYDROCHLOROTHIAZIDE 25 MG: 25 TABLET ORAL at 07:49

## 2017-01-01 RX ADMIN — LEVALBUTEROL HYDROCHLORIDE 0.63 MG: 0.63 SOLUTION RESPIRATORY (INHALATION) at 16:20

## 2017-01-01 RX ADMIN — POTASSIUM CHLORIDE 40 MEQ: 20 TABLET, EXTENDED RELEASE ORAL at 21:17

## 2017-01-01 RX ADMIN — Medication 5 ML: at 06:34

## 2017-01-01 RX ADMIN — LEVALBUTEROL HYDROCHLORIDE 0.63 MG: 0.63 SOLUTION RESPIRATORY (INHALATION) at 07:57

## 2017-01-01 RX ADMIN — BUDESONIDE 500 MCG: 0.5 INHALANT RESPIRATORY (INHALATION) at 19:30

## 2017-01-01 RX ADMIN — IPRATROPIUM BROMIDE 0.5 MG: 0.5 SOLUTION RESPIRATORY (INHALATION) at 16:18

## 2017-01-01 RX ADMIN — IPRATROPIUM BROMIDE 0.5 MG: 0.5 SOLUTION RESPIRATORY (INHALATION) at 07:11

## 2017-01-01 RX ADMIN — GUAIFENESIN 600 MG: 600 TABLET, EXTENDED RELEASE ORAL at 08:42

## 2017-01-01 RX ADMIN — IPRATROPIUM BROMIDE 0.5 MG: 0.5 SOLUTION RESPIRATORY (INHALATION) at 23:48

## 2017-01-01 RX ADMIN — Medication 10 ML: at 21:02

## 2017-01-01 RX ADMIN — LISINOPRIL 40 MG: 20 TABLET ORAL at 09:30

## 2017-01-01 RX ADMIN — DILTIAZEM HYDROCHLORIDE 240 MG: 120 CAPSULE, COATED, EXTENDED RELEASE ORAL at 09:30

## 2017-01-01 RX ADMIN — ENOXAPARIN SODIUM 30 MG: 30 INJECTION SUBCUTANEOUS at 17:18

## 2017-01-01 RX ADMIN — LISINOPRIL 40 MG: 20 TABLET ORAL at 07:49

## 2017-01-01 RX ADMIN — GUAIFENESIN 600 MG: 600 TABLET, EXTENDED RELEASE ORAL at 17:18

## 2017-01-01 RX ADMIN — VENLAFAXINE 37.5 MG: 75 TABLET ORAL at 07:50

## 2017-01-01 RX ADMIN — MULTIPLE VITAMINS W/ MINERALS TAB 1 TABLET: TAB at 08:41

## 2017-01-01 RX ADMIN — LEVALBUTEROL HYDROCHLORIDE 0.63 MG: 0.63 SOLUTION RESPIRATORY (INHALATION) at 03:53

## 2017-01-01 RX ADMIN — IPRATROPIUM BROMIDE 0.5 MG: 0.5 SOLUTION RESPIRATORY (INHALATION) at 19:26

## 2017-01-01 RX ADMIN — Medication 5 ML: at 06:19

## 2017-01-01 RX ADMIN — BUPROPION HYDROCHLORIDE 75 MG: 75 TABLET, FILM COATED ORAL at 07:50

## 2017-01-01 RX ADMIN — BUDESONIDE 500 MCG: 0.5 INHALANT RESPIRATORY (INHALATION) at 07:11

## 2017-01-01 RX ADMIN — IPRATROPIUM BROMIDE 0.5 MG: 0.5 SOLUTION RESPIRATORY (INHALATION) at 07:18

## 2017-01-01 RX ADMIN — MULTIPLE VITAMINS W/ MINERALS TAB 1 TABLET: TAB at 07:50

## 2017-01-01 RX ADMIN — IPRATROPIUM BROMIDE 0.5 MG: 0.5 SOLUTION RESPIRATORY (INHALATION) at 20:42

## 2017-01-01 RX ADMIN — BUDESONIDE 500 MCG: 0.5 INHALANT RESPIRATORY (INHALATION) at 19:26

## 2017-01-01 RX ADMIN — Medication 5 ML: at 21:18

## 2017-01-01 RX ADMIN — BUDESONIDE 500 MCG: 0.5 INHALANT RESPIRATORY (INHALATION) at 20:42

## 2017-01-01 RX ADMIN — METHYLPREDNISOLONE SODIUM SUCCINATE 80 MG: 125 INJECTION, POWDER, FOR SOLUTION INTRAMUSCULAR; INTRAVENOUS at 17:31

## 2017-01-01 RX ADMIN — BUPROPION HYDROCHLORIDE 75 MG: 75 TABLET, FILM COATED ORAL at 09:30

## 2017-01-01 RX ADMIN — LEVOFLOXACIN 750 MG: 5 INJECTION, SOLUTION INTRAVENOUS at 17:29

## 2017-01-01 RX ADMIN — IPRATROPIUM BROMIDE 0.5 MG: 0.5 SOLUTION RESPIRATORY (INHALATION) at 23:20

## 2017-01-01 RX ADMIN — IPRATROPIUM BROMIDE 0.5 MG: 0.5 SOLUTION RESPIRATORY (INHALATION) at 17:34

## 2017-01-01 RX ADMIN — PREDNISONE 40 MG: 20 TABLET ORAL at 09:31

## 2017-01-01 RX ADMIN — IPRATROPIUM BROMIDE 0.5 MG: 0.5 SOLUTION RESPIRATORY (INHALATION) at 19:30

## 2017-01-02 NOTE — DISCHARGE INSTRUCTIONS
Chronic Obstructive Pulmonary Disease (COPD): Care Instructions  Your Care Instructions    Chronic obstructive pulmonary disease (COPD) is a general term for a group of lung diseases, including emphysema and chronic bronchitis. People with COPD have decreased airflow in and out of the lungs, which makes it hard to breathe. The airways also can get clogged with thick mucus. Cigarette smoking is a major cause of COPD. Although there is no cure for COPD, you can slow its progress. Following your treatment plan and taking care of yourself can help you feel better and live longer. Follow-up care is a key part of your treatment and safety. Be sure to make and go to all appointments, and call your doctor if you are having problems. It's also a good idea to know your test results and keep a list of the medicines you take. How can you care for yourself at home? Staying healthy  · Do not smoke. This is the most important step you can take to prevent more damage to your lungs. If you need help quitting, talk to your doctor about stop-smoking programs and medicines. These can increase your chances of quitting for good. · Avoid colds and flu. Get a pneumococcal vaccine shot. If you have had one before, ask your doctor whether you need a second dose. Get the flu vaccine every fall. If you must be around people with colds or the flu, wash your hands often. · Avoid secondhand smoke, air pollution, and high altitudes. Also avoid cold, dry air and hot, humid air. Stay at home with your windows closed when air pollution is bad. Medicines and oxygen therapy  · Take your medicines exactly as prescribed. Call your doctor if you think you are having a problem with your medicine. · You may be taking medicines such as:  ¨ Bronchodilators. These help open your airways and make breathing easier. Bronchodilators are either short-acting (work for 6 to 9 hours) or long-acting (work for 24 hours).  You inhale most bronchodilators, so they start to act quickly. Always carry your quick-relief inhaler with you in case you need it while you are away from home. ¨ Corticosteroids (prednisone, budesonide). These reduce airway inflammation. They come in pill or inhaled form. You must take these medicines every day for them to work well. · A spacer may help you get more inhaled medicine to your lungs. Ask your doctor or pharmacist if a spacer is right for you. If it is, ask how to use it properly. · Do not take any vitamins, over-the-counter medicine, or herbal products without talking to your doctor first.  · If your doctor prescribed antibiotics, take them as directed. Do not stop taking them just because you feel better. You need to take the full course of antibiotics. · Oxygen therapy boosts the amount of oxygen in your blood and helps you breathe easier. Use the flow rate your doctor has recommended, and do not change it without talking to your doctor first.  Activity  · Get regular exercise. Walking is an easy way to get exercise. Start out slowly, and walk a little more each day. · Pay attention to your breathing. You are exercising too hard if you cannot talk while you are exercising. · Take short rest breaks when doing household chores and other activities. · Learn breathing methods--such as breathing through pursed lips--to help you become less short of breath. · If your doctor has not set you up with a pulmonary rehabilitation program, talk to him or her about whether rehab is right for you. Rehab includes exercise programs, education about your disease and how to manage it, help with diet and other changes, and emotional support. Diet  · Eat regular, healthy meals. Use bronchodilators about 1 hour before you eat to make it easier to eat. Eat several small meals instead of three large ones. Drink beverages at the end of the meal. Avoid foods that are hard to chew.   · Eat foods that contain protein so that you do not lose muscle mass.  Mental health  · Talk to your family, friends, or a therapist about your feelings. It is normal to feel frightened, angry, hopeless, helpless, and even guilty. Talking openly about bad feelings can help you cope. If these feelings last, talk to your doctor. When should you call for help? Call 911 anytime you think you may need emergency care. For example, call if:  · You have severe trouble breathing. Call your doctor now or seek immediate medical care if:  · You have new or worse trouble breathing. · You cough up blood. · You have a fever. Watch closely for changes in your health, and be sure to contact your doctor if:  · You cough more deeply or more often, especially if you notice more mucus or a change in the color of your mucus. · You have new or worse swelling in your legs or belly. · You are not getting better as expected. Where can you learn more? Go to http://latisha-maría elena.info/. Sonya Lundy in the search box to learn more about \"Chronic Obstructive Pulmonary Disease (COPD): Care Instructions. \"  Current as of: May 23, 2016  Content Version: 11.1  © 2596-0813 [employer]. Care instructions adapted under license by [employer] (which disclaims liability or warranty for this information). If you have questions about a medical condition or this instruction, always ask your healthcare professional. [address] any warranty or liability for your use of this information.

## 2017-01-02 NOTE — ED PROVIDER NOTES
HPI Comments: Presents with complaint of shortness of breath. Patient wears a mobile oxygen tank that has intermittent O2 release and she was wearing it when she felt short of breath. She states symptoms are mostly resolved now. Denies increased cough chest pain or wheezing. Denies lower extremity edema. Patient no longer smokes. He lives in a residential facility and has a oxygen concentrator in her room but uses it when she goes out for meals. Patient is a 80 y.o. female presenting with shortness of breath. The history is provided by the patient. Shortness of Breath   This is a new problem. The problem occurs continuously. The current episode started 3 to 5 hours ago. The problem has been resolved. Associated symptoms include leg swelling (occasional). Pertinent negatives include no fever, no cough, no sputum production, no wheezing, no chest pain and no leg pain. She has tried nothing for the symptoms. Associated medical issues include COPD. Past Medical History:   Diagnosis Date    Arthritis     Atrial fibrillation ([facility] [address] 2016     one episode while in hospital, on cardizem    Chronic obstructive pulmonary disease ([facility] [address]     Hypertension     Insomnia     Uterine fibroid ? ??       Past Surgical History:   Procedure Laterality Date    Hx tubal ligation Bilateral 1971    Hx carpal tunnel release Left 1975         Family History:   Problem Relation Age of Onset    Cancer Mother 67     Ovarian Cancer    Heart Disease Father 66     Congestive Heart Failure    Stroke Sister 80     Parkinson's Disease, as well    No Known Problems Sister     No Known Problems Brother        Social History     Social History    Marital status:      Spouse name: N/A    Number of children: N/A    Years of education: N/A     Occupational History    Registered nurse      Retired     Social History Main Topics    Smoking status: Former Smoker     Packs/day: 1.00     Years: 60.00     Types: Cigarettes Quit date: 3/1/2016    Smokeless tobacco: Never Used    Alcohol use 8.4 oz/week     14 Standard drinks or equivalent per week      Comment: 2 glasses of wine daily.  Drug use: No    Sexual activity: Not on file     Other Topics Concern    Not on file     Social History Narrative    Mother and daughter had ovarian cancer.  with three children. There is no known exposure to TB. In addition to this area, she has lived in [facility]. There is no significant environmental or industrial exposure. ALLERGIES: Review of patient's allergies indicates no known allergies. Review of Systems   Constitutional: Negative for chills and fever. Respiratory: Positive for shortness of breath. Negative for cough, sputum production and wheezing. Cardiovascular: Positive for leg swelling (occasional). Negative for chest pain. All other systems reviewed and are negative. Vitals:    01/01/17 1701 01/01/17 1704   BP:  105/51   Pulse: 90    Resp: 20    Temp: 97.5 °F (36.4 °C)    SpO2: (!) 82%    Weight: 52.2 kg (115 lb)    Height: 5' 8\" (1.727 m)             Physical Exam   Constitutional: She is oriented to person, place, and time. She appears well-developed and well-nourished. No distress. HENT:   Head: Normocephalic and atraumatic. Neck: Normal range of motion. Neck supple. Cardiovascular: Normal rate and regular rhythm. Pulmonary/Chest: Effort normal. No respiratory distress. She has no wheezes. She has no rales. Abdominal: Soft. She exhibits no distension. There is no tenderness. There is no rebound and no guarding. Musculoskeletal: Normal range of motion. She exhibits edema (mild ankle edema left greater than right). Neurological: She is alert and oriented to person, place, and time. No cranial nerve deficit. Skin: Skin is warm and dry. She is not diaphoretic. Nursing note and vitals reviewed.        MDM  Number of Diagnoses or Management Options  Pulmonary emphysema, unspecified emphysema type [facility]):   SOB (shortness of breath):   Diagnosis management comments: Patient placed on 2 L of oxygen here and her O2 sats were 100%. Her ABG is normal on her normal 2 L. Chest x-ray is negative and her exam was clear. While waiting for results she was given a DuoNeb. Her dimer was negative and her BNP was also normal.  I think she felt short of breath because her mobile O2 tank does not deliver an adequate amount of oxygen and I instructed the patient and the family to turn it up to 4 L when she is using it. She can continue to wear 2 L when using her concentrator at home.        Amount and/or Complexity of Data Reviewed  Clinical lab tests: ordered and reviewed  Independent visualization of images, tracings, or specimens: yes (SR no ST elevation significant motion artifact)    Risk of Complications, Morbidity, and/or Mortality  Presenting problems: high  Diagnostic procedures: moderate  Management options: moderate    Patient Progress  Patient progress: stable    ED Course       Procedures

## 2017-01-02 NOTE — PROGRESS NOTES
Please note patient resides long term in a residential facility and is no eligible for [facility] program.    Tiffany Yancey LPN  Care Coordinator  [facility], [address] [place] [address]  Mobile: [facility] Public Site  [address] [facility] Site  [place] [address] Website

## 2017-01-10 PROBLEM — J44.1 COPD EXACERBATION (HCC): Status: ACTIVE | Noted: 2017-01-01

## 2017-01-10 PROBLEM — R53.81 DEBILITY: Status: ACTIVE | Noted: 2017-01-01

## 2017-01-10 PROBLEM — J96.21 ACUTE ON CHRONIC RESPIRATORY FAILURE WITH HYPOXEMIA (HCC): Status: ACTIVE | Noted: 2017-01-01

## 2017-01-10 NOTE — PROGRESS NOTES
Admission database complete. Prior to admission med list completed with daughter. New patient packet given and reviewed with patient/family. Patient/family oriented to room and call system. SBAR handoff given to primary nurse.

## 2017-01-10 NOTE — PROGRESS NOTES
Respirations even and unlabored. No s/sx distress. No needs at present. No pain or SOB. Assessment completed.

## 2017-01-10 NOTE — H&P
Progress Notes  Encounter Date: 1/10/2017  Bill De Paz NP   Pulmonary Disease             [id number] [place]. [address] [facility], [address] [facility]  [phone]        Patient Name: Matt Chavira  YOB: 1929        Office Visit 1/10/2017     CHIEF COMPLAINT:       Chief Complaint   Patient presents with    Shortness of Breath            HISTORY OF PRESENT ILLNESS:    The patient is an 80year old white female with known underlying COPD GOLD stage IV disease who is admitted from the office with COPD exacerbation and acute on chronic respiratory failure. She is accompanied by her daughter, Varinder Chen. She was last seen in the office on 1/4/17 and was doing relatively well. Over the week-end, she developed worsening shortness of breath and some wheezing. Denies any sick contacts. No fever or chills. No hemoptysis. She is coughing up some pale yellow secretions. She is at the point that she cannot speak without gasping for air.              Past Medical History   Diagnosis Date    Atrial fibrillation ([facility] [address] 2016       one episode while in hospital, on cardizem    Insomnia      Uterine fibroid ? ??            Problem List  Date Reviewed: 1/10/2017             Codes Class Noted     COPD exacerbation [facility]) ICD-10-CM: J44.1  ICD-9-CM: 491.21   1/10/2017           Mixed hyperlipidemia (Chronic) ICD-10-CM: T96.7  ICD-9-CM: 272.2   7/6/2016           Insomnia ICD-10-CM: G47.00  ICD-9-CM: 780.52   Unknown           Essential hypertension with goal blood pressure less than 140/90 (Chronic) ICD-10-CM: I10  ICD-9-CM: 401. 9   4/27/2016           Cachexia ([facility] [address] ICD-10-CM: R64  ICD-9-CM: 799.4   3/21/2016           Atrial fibrillation with rapid ventricular response ([facility] [address] ICD-10-CM: I48.91  ICD-9-CM: 427.31   3/21/2016           Chronic respiratory failure with hypercapnia (HCC) (Chronic) ICD-10-CM: K61.50  ICD-9-CM: 518.83   3/20/2016           Hypoxia ICD-10-CM: K79.97  ICD-9-CM: 799.02   3/17/2016           Personal history of tobacco use (Chronic) ICD-10-CM: N56.821  ICD-9-CM: V15.82   3/17/2016           Osteopenia (Chronic) ICD-10-CM: M85.80  ICD-9-CM: 733.90   7/23/2015           Glaucoma (Chronic) ICD-10-CM: H40.9  ICD-9-CM: 366. 9   7/23/2015           Weight loss, unintentional ICD-10-CM: R63.4  ICD-9-CM: 783.21   7/1/2014     Overview Signed 7/1/2014 2:56 PM by Daril Duty       Has NO appetite               COPD (chronic obstructive pulmonary disease) (HCC) (Chronic) ICD-10-CM: J44.9  ICD-9-CM: 496   8/17/2012     Overview Signed 7/23/2015 3:24 PM by Merline Rover., MD       Gold Stage 3               Depression (Chronic) ICD-10-CM: F32.9  ICD-9-CM: 529   8/17/2012           PAD (peripheral artery disease) (HCC) (Chronic) ICD-10-CM: I73.9  ICD-9-CM: 443. 9   8/17/2012                           Past Surgical History   Procedure Laterality Date    Hx tubal ligation Bilateral 1971    Hx carpal tunnel release Left 1975         No flowsheet data found.           Social History            Social History    Marital status:        Spouse name: N/A    Number of children: N/A    Years of education: N/A            Occupational History    Registered nurse         Retired      Social History Main Topics    Smoking status: Former Smoker       Packs/day: 1.00       Years: 60.00       Types: Cigarettes       Quit date: 3/1/2016    Smokeless tobacco: Never Used    Alcohol use 8.4 oz/week        14 Standard drinks or equivalent per week          Comment: 2 glasses of wine daily.  Drug use: No    Sexual activity: Not on file           Other Topics Concern    Not on file          Social History Narrative     Mother and daughter had ovarian cancer.  with three children. There is no known exposure to TB. In addition to this area, she has lived in [facility].  There is no significant environmental or industrial exposure.                  Family History   Problem Relation Age of Onset    Cancer Mother 67       Ovarian Cancer    Heart Disease Father 66       Congestive Heart Failure    Stroke Sister 80       Parkinson's Disease, as well    No Known Problems Sister      No Known Problems Brother              No Known Allergies               Outpatient Prescriptions Marked as Taking for the 1/10/17 encounter (Office Visit) with Sharyle Bevel, NP   Medication Sig Dispense Refill    traZODone (DESYREL) 50 mg tablet Take by mouth nightly.        venlafaxine (EFFEXOR) 37.5 mg tablet Take 1 Tab by mouth two (2) times a day. 60 Tab 3    CRANBERRY FRUIT EXTRACT (CRANBERRY CONCENTRATE PO) Take by mouth.        OTHER alteril tablet pt takes 2 prn for sleep        raloxifene (EVISTA) 60 mg tablet Take 1 Tab by mouth daily. 90 Tab 3    dilTIAZem CD (CARDIZEM CD) 240 mg ER capsule Take 1 Cap by mouth daily. 90 Cap 3    lisinopril (PRINIVIL, ZESTRIL) 40 mg tablet Take 1 Tab by mouth daily. 90 Tab 3    HYDROcodone-acetaminophen (NORCO) 5-325 mg per tablet Take 1 Tab by mouth every eight (8) hours as needed for Pain. Max Daily Amount: 3 Tabs. 48 Tab 0    buPROPion (WELLBUTRIN) 75 mg tablet Take 1 Tab by mouth daily. 30 Tab 3    ALPRAZolam (XANAX) 0.25 mg tablet Take 1 Tab by mouth daily as needed. 30 Tab 1    tiotropium (SPIRIVA WITH HANDIHALER) 18 mcg inhalation capsule Take 1 Cap by inhalation daily. 30 Cap 3    hydrochlorothiazide (HYDRODIURIL) 25 mg tablet Take 1 Tab by mouth daily. 90 Tab 3    predniSONE (DELTASONE) 10 mg tablet Take by mouth daily (with breakfast).        fluticasone-salmeterol (ADVAIR DISKUS) 250-50 mcg/dose diskus inhaler Take 1 Puff by inhalation two (2) times a day. 1 Inhaler 11    albuterol (PROVENTIL VENTOLIN) 2.5 mg /3 mL (0.083 %) nebulizer solution Pt to use twice daily dx code 496 (Patient taking differently: Take 2.5 mg by inhalation every four (4) hours as needed.  Pt to use twice daily dx code 496) 1 Package 12    Nebulizer & Compressor machine Pt uses twice daily dx code 496 1 Each 0    latanoprost (XALATAN) 0.005 % ophthalmic solution Administer 1 Drop to both eyes nightly.        GUAIFENESIN (MUCINEX PO) Take 600 mg by mouth daily as needed. Unknown dose        calcium-cholecalciferol, d3, (CALCIUM 600 + D) 600-125 mg-unit Tab Take by mouth two (2) times a day.         multivitamin (ONE A DAY) tablet Take 1 Tab by mouth daily.                    REVIEW OF SYSTEMS:  CONSTITUTIONAL:  There is no history of fever, chills, night sweats, weight loss, weight gain, persistent fatigue, or lethargy/hypersomnolence. CARDIAC:  No chest pain, pressure, discomfort, palpitations, orthopnea, murmurs, or edema. GI:  No dysphagia, heartburn reflux, nausea/vomiting, diarrhea, abdominal pain, or bleeding. NEURO:  There is no history of AMS, persistent headache, decreased level of consciousness, seizures, or motor or sensory deficits.        PHYSICAL EXAM:          Visit Vitals    /69 (BP 1 Location: Left arm, BP Patient Position: Sitting)    Pulse 90    Temp 98.5 °F (36.9 °C) (Oral)    Resp 28    Ht 5' 6\" (1.676 m)    Wt 120 lb (54.4 kg)    SpO2 (!) 88%    BMI 19.37 kg/m2         GENERAL APPEARANCE:  The patient is normal weight and in no respiratory distress. HEENT:  PERRL. Conjunctivae unremarkable. Nasal mucosa is without epistaxis, exudate, or polyps. Gums and dentition are unremarkable. There is no oropharyngeal narrowing. TMs are clear. NECK/LYMPHATIC:  Symmetrical with no elevation of jugular venous pulsation. Trachea midline. No thyroid enlargement. No cervical adenopathy. LUNGS:  Increased respiratory effort with symmetrical lung expansion. Breath sounds decreased bilaterally with wheezing. HEART:  There is a regular rate and rhythm. No murmur, rub, or gallop. There is no edema in the lower extremities. ABDOMEN:  Soft and non-tender. No hepatosplenomegaly. Bowel sounds are normal.   NEURO:  The patient is alert and oriented to person, place, and time.  Memory appears intact and mood is normal. No gross sensorimotor deficits are present.        DIAGNOSTIC TESTS:  None today. Previous spirometry done 7/6/16 reveals FEV1 of 0.5 liters or 28% of predicted.     ASSESSMENT:  (Medical Decision Making)          ICD-10-CM ICD-9-CM     1. COPD exacerbation ([address]  Worse--treatment needs to be intensified. Due to the severity of her underlying illness, will require inpatient treatment. J44.1 491.21     2. Acute on chronic respiratory failure with hypoxia and hypercapnia (HCC) J96.21 518.84       J96.22 786.09         799.02     3. Personal history of tobacco use Z87.891 V15.82     4. DNR (do not resuscitate) discussion Z71.89 V65.49        PLAN:  Patient is seen and examined with Dr. Yusuf John. Will proceed with in patient admission to [facility]. Increase O2. Levaquin, bronchodilators and steroids. Discussed end of life issue with patient and daughter. She does have a living will and she wishes to be a DNR.     No orders of the defined types were placed in this encounter.        Over 50% of today's office visit was spent in face to face time reviewing test results/records, prognosis, importance of compliance, education about disease process, benefits of medications, instructions for management of acute flare-ups, and follow up plans. Total time spent was 30 minutes.        Cristino Raya NP  Electronically signed     Dictated using voice recognition software. Proof read but unrecognized errors may exist.      Electronically signed by Cristino Raya NP at 01/10/17 1654         Lungs: b/l wheezing. Heart S1 and S2 audible, no murmers or rubs appreciated  Other     Will need abx, steroids, nebs. Does not like albuterol. Will try xopenex. Also cannot affort Spiriva. Reported cost 400 for her and she did not fill it. Consider atrovent nebulizer for home when discharged. DNR per discussion with patient and her daughter. Will try airvo to see if helps decrease work of breathing.     I have spoken with and examined the patient. I have reviewed the history, examination, assessment, and plan and agree with the above. Perla Bruce MD      This note was signed electronically. Errors are unfortunately her likely due to dictation software.

## 2017-01-10 NOTE — PROGRESS NOTES
Dual full body skin assessment completed by Todd Chung, JACKIE and [facility], RN. Elbows intact without redness or breakdown. Sacrum intact without redness or breakdown. Heels intact without redness or breakdown.

## 2017-01-10 NOTE — PROGRESS NOTES
Xray noted with no acute pathology. CBC, BMP noted. Will place on nutrition support orders for k+    Norm MD [place]      LABS    Recent Labs      01/10/17   1655   WBC  9.2   HGB  14.4   HCT  42.8   PLT  240     Recent Labs      01/10/17   1655   NA  143   K  3.1*   CL  95*   GLU  122*   CO2  40*   BUN  19   CREA  1.14*   MG  2.1     No results for input(s): PH, PCO2, PO2, HCO3 in the last 72 hours.

## 2017-01-11 PROBLEM — Z66 DNR (DO NOT RESUSCITATE): Status: ACTIVE | Noted: 2017-01-01

## 2017-01-11 PROBLEM — Z87.891 PERSONAL HISTORY OF TOBACCO USE: Chronic | Status: ACTIVE | Noted: 2017-01-01

## 2017-01-11 NOTE — PROGRESS NOTES
[facility]  Admission Date: 1/10/2017             Daily Progress Note: 1/11/2017    The patient's chart is reviewed and the patient is discussed with the staff. 81 yo with known underlying COPD GOLD stage IV disease was admitted from the office with COPD exacerbation and acute on chronic respiratory failure. Over the week-end, she developed worsening shortness of breath, some wheezing and with pale yellow secretions. She cannot speak without gasping for air. Subjective:     States breathing is better today but did not sleep any last night. Occasional productive cough and sputum is clearer. Remains on Opti-flow.     Current Facility-Administered Medications   Medication Dose Route Frequency    buPROPion (WELLBUTRIN) tablet 75 mg  75 mg Oral DAILY    dilTIAZem CD (CARDIZEM CD) capsule 240 mg  240 mg Oral DAILY    guaiFENesin SR (MUCINEX) tablet 600 mg  600 mg Oral BID    hydroCHLOROthiazide (HYDRODIURIL) tablet 25 mg  25 mg Oral DAILY    latanoprost (XALATAN) 0.005 % ophthalmic solution 1 Drop  1 Drop Both Eyes QHS    lisinopril (PRINIVIL, ZESTRIL) tablet 40 mg  40 mg Oral DAILY    multivitamin, tx-iron-ca-min (THERA-M w/ IRON) tablet 1 Tab  1 Tab Oral DAILY    raloxifene (EVISTA) tablet 60 mg (patient supplied)  60 mg Oral DAILY    venlafaxine (EFFEXOR) tablet 37.5 mg  37.5 mg Oral BID    sodium chloride (NS) flush 5-10 mL  5-10 mL IntraVENous Q8H    sodium chloride (NS) flush 5-10 mL  5-10 mL IntraVENous PRN    enoxaparin (LOVENOX) injection 30 mg  30 mg SubCUTAneous Q24H    budesonide (PULMICORT) 500 mcg/2 ml nebulizer suspension  500 mcg Nebulization BID RT    ipratropium (ATROVENT) 0.02 % nebulizer solution 0.5 mg  0.5 mg Nebulization Q4H RT    levalbuterol (XOPENEX) nebulizer soln 0.63 mg/3 mL  0.63 mg Nebulization Q4H RT    levoFLOXacin (LEVAQUIN) 750 mg in D5W IVPB  750 mg IntraVENous Q48H    dextrose 5% and 0.9% NaCl infusion  50 mL/hr IntraVENous CONTINUOUS    methylPREDNISolone (PF) (SOLU-MEDROL) injection 80 mg  80 mg IntraVENous Q8H       Review of Systems  Constitutional: negative for fever, chills, sweats  Cardiovascular: negative for chest pain, palpitations, syncope, edema  Gastrointestinal:  negative for dysphagia, reflux, vomiting, diarrhea, abdominal pain, or melena  Neurologic:  negative for focal weakness, numbness, headache    Objective:     Vitals:    01/11/17 0349 01/11/17 0446 01/11/17 0645 01/11/17 0711   BP: 134/88  168/63    Pulse: 74  77    Resp: 20  19    Temp: 98.3 °F (36.8 °C)  97.3 °F (36.3 °C)    SpO2: 96% 98% 96% 98%     Intake and Output:   01/09 1901 - 01/11 0700  In: 60 [P.O.:60]  Out: 10 [Urine:10]  01/11 0701 - 01/11 1900  In: -   Out: 200 [Urine:200]    Physical Exam:   Constitution:  the patient is thin, elderly and in no acute distress, Opti-flow 40L, 30%, sat 98%  EENMT:  Sclera clear, pupils equal, oral mucosa moist  Respiratory: few scattered crackles, no wheezing  Cardiovascular:  RRR without M,G,R  Gastrointestinal: soft and non-tender; with positive bowel sounds. Musculoskeletal: warm without cyanosis. There is no lower leg edema. Skin:  no jaundice or rashes, no wounds   Neurologic: no gross neuro deficits     Psychiatric:  alert and oriented x 3    CHEST XRAY: None today    CHEST XRAY 1/10/17:        LAB  No results for input(s): GLUCPOC in the last 72 hours. No lab exists for component: [place]   Recent Labs      01/10/17   1655   WBC  9.2   HGB  14.4   HCT  42.8   PLT  240     Recent Labs      01/10/17   1655   NA  143   K  3.1*   CL  95*   CO2  40*   GLU  122*   BUN  19   CREA  1.14*   MG  2.1   CA  9.6     No results for input(s): PH, PCO2, PO2, HCO3 in the last 72 hours. No results for input(s): LCAD, LAC in the last 72 hours.       Assessment:  (Medical Decision Making)     Hospital Problems  Date Reviewed: 1/11/2017          Codes Class Noted POA    DNR (do not resuscitate) ICD-10-CM: Z66  ICD-9-CM: V49.86 1/11/2017 Yes    unchanged    Personal history of tobacco use (Chronic) ICD-10-CM: K29.107  ICD-9-CM: V15.82  1/10/2017 Yes    chronic    * (Principal)COPD exacerbation ([facility] [address] ICD-10-CM: J44.1  ICD-9-CM: 491.21  1/10/2017 Yes    continue current    Acute on chronic respiratory failure with hypoxemia [facility]) ICD-10-CM: K51.87  ICD-9-CM: 518.84  1/10/2017 Yes    Wean O2-was not on prior to admission    Debility ICD-10-CM: R53.81  ICD-9-CM: 799.3  1/10/2017 Yes    Will get PT to see          Plan:  (Medical Decision Making)     --D5NS 50ml/hr--stop  --Xopenex, Atrovent, Pulmicort, Mucinex  --Levaquin day 2  --Blood culture: pending  --Solu Medrol 80mg y7x--bynu  --Wean O2 as tolerated--was not on home O2 prior to admission. --Follow up labs in AM  --OOB with assistance    More than 50% of the time documented was spent in face-to-face contact with the patient and in the care of the patient on the floor/unit where the patient is located. Caryle Dach, NP  I have spoken with and examined the patient. I agree with the above assessment and plan as documented.     Gen: appears uncomfortable with breathing  Lungs: coarse BS in bilateral bases L>R, bilateral wheezes  Heart:  RRR with no Murmur/Rubs/Gallops  Ext: no edema    --Continue bronchodilators, steroids  --Monitor for edema  --Continue antibiotics  --f/u labs tomorrow        Felix Shaffer MD

## 2017-01-11 NOTE — PROGRESS NOTES
Problem: Nutrition Deficit  Goal: *Optimize nutritional status  Nutrition  Reason for assessment: Referral received from nursing admission Malnutrition Screening Tool for recently lost unsure amount without trying and eating poorly due to decreased appetite. Electrolyte Consult per Dr. Lg Huggins:   Diet order(s): Cardiac  Food/Nutrition Patient History:  Pt presents with h/o COPD, unintended weight loss and cachexia. Pt reports she does not know how much she currently weighs or how much weight loss she has had. Reports poor appetite for past year and that she has only been consuming ~25% of what she used to eat now for the past year. RN Bobby Savage obtained a current weight of 53.5kg for this admission. According to the EMR pt has lost ~16# since October of 2016. This is an 11.7% weight loss over the past 3 months. Labs are remarkable for K+ 3.1 (KCl supplementation given yesterday- electrolyte protocol activated in MAR at this time), creatinine 1.14, GFR 48. Anthropometrics: Height: 5' 6\"  Weight: 53.5 kg (117 lb 14.4 oz), Weight Source: Bed (scale zero weighed with 1 fitted sheet/1 pillow/1 pad), Body mass index is 19.03 kg/(m^2). BMI class of underweight for age >71. Macronutrient needs:  EER:  5718-2748 kcal /day (30-33 kcal/kg actual BW)  EPR:  43-54 grams protein/day (0.8-1 grams/kg actual BW) (GFR 48)  Intake/Comparative Standards: Average intake for past 1 recorded meal(s): 0%. This potentially meets ~0% of kcal and ~0% of protein needs     Nutrition Diagnosis: Unintended weight loss related to poor appetite as evidenced by pt with significant 11.7% weight loss over the past 3 months and reports only consuming ~25% of what she normally eats.       Meets Criteria for Malnutrition in the context of Chronic Illness   [X] Severe Malnutrition, as evidenced by:              [ ] Severe loss of muscle mass              [X] Nutritional intake of <75% of energy intake compared to estimated energy needs for > 1 month              [X] Weight loss of >5% in 1 month, >7.5% in 3 months,  >10% in 6 months, or >20% in 12 months              [ ] Severe edema              [ ] Severe loss of subcutaneous fat              [ ] Functional decline         Intervention:  Meals and snacks: Continue current diet. Nutrition Supplement Therapy: Add ensure tid   Coordination of Nutrition Care: Tony Perea RN  Electrolyte protocols activated on [facility]     Travis Smyth [address], [address],  [phone]

## 2017-01-11 NOTE — PROGRESS NOTES
Problem: Interdisciplinary Rounds  Goal: Interdisciplinary Rounds  Outcome: Progressing Towards Goal  Interdisciplinary team rounds were held 1/11/2017 with the following team members:Care Management, Nursing and Clinical Coordinator and the patient. Plan of care discussed. See clinical pathway and/or care plan for interventions and desired outcomes.

## 2017-01-12 NOTE — PROGRESS NOTES
Angela Lamb  Admission Date: 1/10/2017             Daily Progress Note: 1/12/2017    The patient's chart is reviewed and the patient is discussed with the staff. 79 yo with known underlying COPD GOLD stage IV disease was admitted from the office with COPD exacerbation and acute on chronic respiratory failure. Over the week-end, she developed worsening shortness of breath, some wheezing and with pale yellow secretions. Subjective:     Patient lying in bed receiving a breathing treatment. Has been eating breakfast. Stated that she \"feels terrible\" and didn't sleep well last night. She is on 2L NC without complaints of shortness of breath. Patient states that she has a home concentrator and was using 2L O2 at home continuously.      Current Facility-Administered Medications   Medication Dose Route Frequency    methylPREDNISolone (PF) (SOLU-MEDROL) injection 40 mg  40 mg IntraVENous Q12H    buPROPion (WELLBUTRIN) tablet 75 mg  75 mg Oral DAILY    dilTIAZem CD (CARDIZEM CD) capsule 240 mg  240 mg Oral DAILY    guaiFENesin SR (MUCINEX) tablet 600 mg  600 mg Oral BID    hydroCHLOROthiazide (HYDRODIURIL) tablet 25 mg  25 mg Oral DAILY    latanoprost (XALATAN) 0.005 % ophthalmic solution 1 Drop  1 Drop Both Eyes QHS    lisinopril (PRINIVIL, ZESTRIL) tablet 40 mg  40 mg Oral DAILY    multivitamin, tx-iron-ca-min (THERA-M w/ IRON) tablet 1 Tab  1 Tab Oral DAILY    raloxifene (EVISTA) tablet 60 mg (patient supplied)  60 mg Oral DAILY    venlafaxine (EFFEXOR) tablet 37.5 mg  37.5 mg Oral BID    sodium chloride (NS) flush 5-10 mL  5-10 mL IntraVENous Q8H    sodium chloride (NS) flush 5-10 mL  5-10 mL IntraVENous PRN    enoxaparin (LOVENOX) injection 30 mg  30 mg SubCUTAneous Q24H    budesonide (PULMICORT) 500 mcg/2 ml nebulizer suspension  500 mcg Nebulization BID RT    ipratropium (ATROVENT) 0.02 % nebulizer solution 0.5 mg  0.5 mg Nebulization Q4H RT    levalbuterol (Durenda Soto) nebulizer soln 0.63 mg/3 mL  0.63 mg Nebulization Q4H RT    levoFLOXacin (LEVAQUIN) 750 mg in D5W IVPB  750 mg IntraVENous Q48H       Review of Systems    Constitutional: negative for fever, chills, sweats  Cardiovascular: negative for chest pain, palpitations, syncope, edema  Gastrointestinal:  negative for dysphagia, reflux, vomiting, diarrhea, abdominal pain, or melena  Neurologic:  negative for focal weakness, numbness, headache    Objective:     Vitals:    01/12/17 0026 01/12/17 0318 01/12/17 0712 01/12/17 0758   BP: 142/65 144/60 154/66    Pulse: 90 90 93    Resp: 18 18 19    Temp: 98.4 °F (36.9 °C) 98.4 °F (36.9 °C) 97.8 °F (36.6 °C)    SpO2: 98% 98% 95% 95%   Weight:         Intake and Output:   01/10 1901 - 01/12 0700  In: 480 [P.O.:480]  Out: 710 [Urine:710]  01/12 0701 - 01/12 1900  In: 120 [P.O.:120]  Out: -     Physical Exam:   Constitution:  the patient is elderly, thin and in no acute distress, on 2L O2  EENMT:  Sclera clear, pupils equal, oral mucosa moist  Respiratory: course anterior, crackles posterior   Cardiovascular:  RRR without M,G,R  Gastrointestinal: soft and non-tender; with positive bowel sounds. Musculoskeletal: warm without cyanosis. There is no lower leg edema. Skin:  no jaundice or rashes, no wounds   Neurologic: no gross neuro deficits     Psychiatric:  alert and oriented x 3    CHEST XRAY: none today  CHEST XRAY 1/10/17:    Impressions: Stable portable chest      LAB  No results for input(s): GLUCPOC in the last 72 hours. No lab exists for component: [place]   Recent Labs      01/10/17   1655   WBC  9.2   HGB  14.4   HCT  42.8   PLT  240     Recent Labs      01/10/17   1655   NA  143   K  3.1*   CL  95*   CO2  40*   GLU  122*   BUN  19   CREA  1.14*   MG  2.1   CA  9.6     No results for input(s): PH, PCO2, PO2, HCO3 in the last 72 hours. No results for input(s): LCAD, LAC in the last 72 hours.       Assessment:  (Medical Decision Making)     Hospital Problems  Date Reviewed: 1/12/2017          Codes Class Noted POA    DNR (do not resuscitate) ICD-10-CM: Z66  ICD-9-CM: V49.86  1/11/2017 Yes    Unchanged    Personal history of tobacco use (Chronic) ICD-10-CM: O75.834  ICD-9-CM: V15.82  1/10/2017 Yes    Chronic     * (Principal)COPD exacerbation ([facility] [address] ICD-10-CM: J44.1  ICD-9-CM: 491.21  1/10/2017 Yes    No wheezing     Acute on chronic respiratory failure with hypoxemia [facility]) ICD-10-CM: J96.21  ICD-9-CM: 518.84  1/10/2017 Yes    On 2L, O2 sat 95%     Debility ICD-10-CM: R53.81  ICD-9-CM: 799.3  1/10/2017 Yes    Will order PT , assess rehab potential      Insomnia:  Resume nightly tramadol and prn restoril is also available now. Plan:  (Medical Decision Making)     --Pulmicort, Atrovent, Xopenex, Mucinex,   --Levaquin day 3  --Blood culture--pending   --Solu-medrol 40mg IV q12  --BMP drawn this morning- pending   --PT consult   --Consult case management for possible STR, patient states that she cannot resume her prior activity level, was living    in [facility] prior to hospital admission   --Will obtain qualifying ambulating oxygen sats prior to discharge    --Will order home dose trazodone for sleep     More than 50% of the time documented was spent in face-to-face contact with the patient and in the care of the patient on the floor/unit where the patient is located. Reba Zuniga NP    I have spoken with and examined the patient. I agree with the above assessment and plan as documented. Pt says she had a very difficult evening last night due to insomnia. Remains very dyspneic and has not been out of bed.     Gen:  Pleasant, but appears exhausted  Lungs: decreased BS bilaterally  Heart:  RRR with no Murmur/Rubs/Gallops  Ext:  No edema    Agree with ongoing inpatient  bronchodilator regimen and continuation of IV steroids today  Needs to ambulate, get OOB and do IS  Appreciate PT input about qualification for rehab    Gurmeet Andres MD

## 2017-01-12 NOTE — PROGRESS NOTES
Mrs. Jamey Mercer resting in bed watching TV. Has just finished dinner tray; poor appetite. 2 lpm NC remains in place. Worked with therapy today and states she is tired. Without needs or complaints. Did not have bowel movement today. Will monitor closely with door open and call light in lap.

## 2017-01-12 NOTE — PROGRESS NOTES
Problem: Mobility Impaired (Adult and Pediatric)  Goal: *Acute Goals and Plan of Care (Insert Text)  LTG:  (1.)Ms. Isidro Pacheco will move from supine to sit and sit to supine , scoot up and down and roll side to side with MODIFIED INDEPENDENCE within 7 day(s). (2.)Ms. Isidro Pacheco will transfer from bed to chair and chair to bed with MODIFIED INDEPENDENCE using the least restrictive device within 7 day(s). (3.)Ms. Isidro Pacheco will ambulate with MODIFIED INDEPENDENCE for 150+ feet with the least restrictive device within 7 day(s), O2 stat >90%. _____________________________________________________________________________________________      PHYSICAL THERAPY: INITIAL ASSESSMENT, AM 1/12/2017  INPATIENT: Hospital Day: 3  [place] / [place] / Product Type: Medicare /      NAME/AGE/GENDER: Diogenes Fernandez is a 80 y.o. female            PRIMARY DIAGNOSIS: resp failure  copd exacerbation  COPD exacerbation ([facility]) COPD exacerbation ([facility] [address] COPD exacerbation ([facility] [address]        ICD-10: Treatment Diagnosis: Generalized Muscle Weakness (M62.81)  Difficulty in walking, Not elsewhere classified (R26.2)  Precautions/Allergies: Adderall [dextroamphetamine-amphetamine] and Proair hfa [albuterol sulfate]       ASSESSMENT:      Ms. Isidro Pacheco presents with decreased bed mobility, transfers, ambulation, balance, activity tolerance, and overall general functional mobility s/p hospital admission with COPD exacerbation. Pt presents this date on 2 L/min O2, supine on arrival, daughter present in room. Pt states she lives in independent living, independent in ADLs, requires assist with bathing. Pt has paid caregiver 2 days/week. Pt uses 3 wheeled RW for ambulation and on home O2. Pt states she is having difficulty in independent living, feels she needs more assist at this time. Pt required SBA for bed mobility, demo good static sitting balance.  Pt MMT in sitting at bedside, B LE grossly 4/5, sensation intact to light touch. Pt required CGA for sit to stand, pt states unable to ambulate due to increased fatigue, able to take side steps to [facility] with MIN A. Unable to get O2 stat reading on pt at this time. Pt declined sitting to chair, relates increased fatigue and states she would like to rest. Pt SBA to return to supine, needs in reach. PT to follow for acute care needs. Pt may benefit from rehab at discharge pending progress. This section established at most recent assessment   PROBLEM LIST (Impairments causing functional limitations):  1. Decreased ADL/Functional Activities  2. Decreased Transfer Abilities  3. Decreased Ambulation Ability/Technique  4. Decreased Balance  5. Decreased Activity Tolerance  6. Increased Fatigue  7. Increased Shortness of Breath    INTERVENTIONS PLANNED: (Benefits and precautions of physical therapy have been discussed with the patient.)  1. Balance Exercise  2. Bed Mobility  3. Family Education  4. Gait Training  5. Home Exercise Program (HEP)  6. Therapeutic Activites  7. Therapeutic Exercise/Strengthening  8. Transfer Training  9. Group Therapy      TREATMENT PLAN: Frequency/Duration: 3 times a week for duration of hospital stay  Rehabilitation Potential For Stated Goals: GOOD      RECOMMENDED REHABILITATION/EQUIPMENT: (at time of discharge pending progress): Continue Skilled Therapy and and rehab pending progress. HISTORY:   History of Present Injury/Illness (Reason for Referral): The patient is an 80year old white female with known underlying COPD GOLD stage IV disease who is admitted from the office with COPD exacerbation and acute on chronic respiratory failure. She is accompanied by her daughter, Patricia Mercado. She was last seen in the office on 1/4/17 and was doing relatively well. Over the week-end, she developed worsening shortness of breath and some wheezing. Denies any sick contacts. No fever or chills. No hemoptysis.  She is coughing up some pale yellow secretions. She is at the point that she cannot speak without gasping for air. Past Medical History/Comorbidities:   Ms. Jv Real  has a past medical history of Atrial fibrillation ([facility] [address] (2016); Insomnia; and Uterine fibroid (???). Ms. Jv Real  has a past surgical history that includes tubal ligation (Bilateral, 1971) and carpal tunnel release (Left, 1975).   Social History/Living Environment:   Home Environment: Independent living ([place] )  Wheelchair Ramp: Yes  One/Two Story Residence: One story  Living Alone: Yes  Support Systems: Child(trent)  Patient Expects to be Discharged to[de-identified] Unknown  Current DME Used/Available at Home: Grab bars, Hospital bed, Oxygen, portable, Raised toilet seat, Shower chair, Walker, rollator, Wheelchair  Tub or Shower Type: Shower (CG 2 days/week for assist with showering)  Prior Level of Function/Work/Activity:  Lives in independent living; uses 3 wheeled rolling walker, on home O2, assist with bathing; independent with dressing  Current Medications:           Current Facility-Administered Medications:     traZODone (DESYREL) tablet 50 mg, 50 mg, Oral, QHS, Felice Tijerina MD    temazepam (RESTORIL) capsule 15 mg, 15 mg, Oral, QHS PRN, Mary Jane Prince MD    tuberculin injection 5 Units, 5 Units, IntraDERMal, ONCE, Mary Jane Prince MD    methylPREDNISolone (PF) (SOLU-MEDROL) injection 40 mg, 40 mg, IntraVENous, Q12H, Jeramy Schreiber, CHRISTA, 40 mg at 01/12/17 0619    buPROPion (WELLBUTRIN) tablet 75 mg, 75 mg, Oral, DAILY, Felice Tijerina MD, 75 mg at 01/12/17 0750    dilTIAZem CD (CARDIZEM CD) capsule 240 mg, 240 mg, Oral, DAILY, Felice Tijerina MD, 240 mg at 01/12/17 0749    guaiFENesin SR (MUCINEX) tablet 600 mg, 600 mg, Oral, BID, Daiana Marquez MD, 600 mg at 01/12/17 0750    hydroCHLOROthiazide (HYDRODIURIL) tablet 25 mg, 25 mg, Oral, DAILY, Felice Tijerina MD, 25 mg at 01/12/17 0749    latanoprost (XALATAN) 0.005 % ophthalmic solution 1 Drop, 1 Drop, Both Eyes, QHS, Felice Ambriz MD, 1 Drop at 01/11/17 2231    lisinopril (PRINIVIL, ZESTRIL) tablet 40 mg, 40 mg, Oral, DAILY, Felice Ambriz MD, 40 mg at 01/12/17 0749    multivitamin, tx-iron-ca-min (THERA-M w/ IRON) tablet 1 Tab, 1 Tab, Oral, DAILY, Felice Ambriz MD, 1 Tab at 01/12/17 0750    raloxifene (EVISTA) tablet 60 mg (patient supplied), 60 mg, Oral, DAILY, Felice Ambriz MD, Stopped at 01/11/17 0900    venlafaxine (EFFEXOR) tablet 37.5 mg, 37.5 mg, Oral, BID, Felice Ambriz MD, 37.5 mg at 01/12/17 0750    sodium chloride (NS) flush 5-10 mL, 5-10 mL, IntraVENous, Q8H, Felice Ambriz MD, 5 mL at 01/12/17 0619    sodium chloride (NS) flush 5-10 mL, 5-10 mL, IntraVENous, PRN, Hermelindo Avilez MD    enoxaparin (LOVENOX) injection 30 mg, 30 mg, SubCUTAneous, Q24H, Felice Ambriz MD, 30 mg at 01/11/17 1809    budesonide (PULMICORT) 500 mcg/2 ml nebulizer suspension, 500 mcg, Nebulization, BID RT, Hermelindo Avilez MD, 500 mcg at 01/12/17 0757    ipratropium (ATROVENT) 0.02 % nebulizer solution 0.5 mg, 0.5 mg, Nebulization, Q4H RT, Hermelindo Avilez MD, Stopped at 01/12/17 1137    levalbuterol (XOPENEX) nebulizer soln 0.63 mg/3 mL, 0.63 mg, Nebulization, Q4H RT, Hermelindo Avilez MD, Stopped at 01/12/17 1137    levoFLOXacin (LEVAQUIN) 750 mg in D5W IVPB, 750 mg, IntraVENous, Q48H, Felice Ambriz MD, Last Rate: 100 mL/hr at 01/10/17 1729, 750 mg at 01/10/17 1918   Date Last Reviewed:     Number of Personal Factors/Comorbidities that affect the Plan of Care:  COPD, A Fib 1-2: MODERATE COMPLEXITY   EXAMINATION:   Most Recent Physical Functioning:   Gross Assessment:  AROM: Generally decreased, functional  Strength: Generally decreased, functional  Coordination: Generally decreased, functional  Sensation: Intact               Posture:  Posture (WDL): Exceptions to WDL  Posture Assessment:  Forward head, Rounded shoulders  Balance:    Bed Mobility:  Rolling: Stand-by asssistance  Supine to Sit: Stand-by asssistance  Sit to Supine: Stand-by asssistance  Scooting: Supervision  Wheelchair Mobility:     Transfers:  Sit to Stand: Contact guard assistance  Stand to Sit: Contact guard assistance  Bed to Chair: Contact guard assistance  Gait:     Base of Support: Narrowed  Speed/Ban: Slow  Step Length: Left shortened;Right shortened  Gait Abnormalities: Decreased step clearance;Shuffling gait  Distance (ft): 3 Feet (ft) (side steps to [facility])  Assistive Device:  (3 wheeled RW)  Ambulation - Level of Assistance: Minimal assistance  Interventions: Safety awareness training;Verbal cues       Body Structures Involved:  1. Lungs  2. Joints  3. Muscles Body Functions Affected:  1. Respiratory  2. Movement Related Activities and Participation Affected:  1. General Tasks and Demands  2. Mobility  3. Self Care   Number of elements that affect the Plan of Care: 4+: HIGH COMPLEXITY   CLINICAL PRESENTATION:   Presentation: Evolving clinical presentation with changing clinical characteristics: MODERATE COMPLEXITY   CLINICAL DECISION MAKIN [facility] Mobility Inpatient Short Form  How much difficulty does the patient currently have. .. Unable A Lot A Little None   1. Turning over in bed (including adjusting bedclothes, sheets and blankets)? [ ] 1   [ ] 2   [X] 3   [ ] 4   2. Sitting down on and standing up from a chair with arms ( e.g., wheelchair, bedside commode, etc.)   [ ] 1   [ ] 2   [X] 3   [ ] 4   3. Moving from lying on back to sitting on the side of the bed? [ ] 1   [ ] 2   [X] 3   [ ] 4   How much help from another person does the patient currently need. .. Total A Lot A Little None   4. Moving to and from a bed to a chair (including a wheelchair)? [ ] 1   [ ] 2   [X] 3   [ ] 4   5. Need to walk in hospital room? [ ] 1   [ ] 2   [X] 3   [ ] 4   6. Climbing 3-5 steps with a railing?    [ ] 1   [X] 2   [ ] 3   [ ] 4   © , Trustees of [address] [place] [address] [address] [address], under license to [employer]. All rights reserved    Score:  Initial: 17 Most Recent: X (Date: -- )     Interpretation of Tool:  Represents activities that are increasingly more difficult (i.e. Bed mobility, Transfers, Gait). Score 24 23 22-20 19-15 14-10 9-7 6       Modifier CH CI CJ CK CL CM CN         · Mobility - Walking and Moving Around:               - CURRENT STATUS:    CK - 40%-59% impaired, limited or restricted               - GOAL STATUS:           CK - 40%-59% impaired, limited or restricted               - D/C STATUS:                       ---------------To be determined---------------  [place] / Product Type: Medicare /       Medical Necessity:     · Patient is expected to demonstrate progress in strength, range of motion, balance and coordination to decrease assistance required with overall functional mobility, transfers, ambulation. · Patient demonstrates good rehab potential due to higher previous functional level.   Reason for Services/Other Comments:  · Patient continues to require present interventions due to patient's inability to perform bed mobility, transfers, ambulation all safely and effectively at prior level of function of mod I.   Use of outcome tool(s) and clinical judgement create a POC that gives a: Questionable prediction of patient's progress: MODERATE COMPLEXITY                 TREATMENT:   (In addition to Assessment/Re-Assessment sessions the following treatments were rendered)   Pre-treatment Symptoms/Complaints:  2 L/min O2; fatigued  Pain: Initial:   Pain Intensity 1: 0  Post Session:  0      Assessment/Reassessment only, no treatment provided today     Treatment/Session Assessment:    · Response to Treatment:  Fatigued  · Interdisciplinary Collaboration:  · Physical Therapist and Registered Nurse  · After treatment position/precautions:  · Supine in bed, Bed/Chair-wheels locked, Bed in low position, Call light within yana RN notified, Family at bedside and Side rails x 2  · Compliance with Program/Exercises: Will assess as treatment progresses. · Recommendations/Intent for next treatment session: \"Next visit will focus on advancements to more challenging activities\".   Total Treatment Duration:  PT Patient Time In/Time Out  Time In: 1127  Time Out: [address], PT

## 2017-01-12 NOTE — PROGRESS NOTES
Met with patient regarding discharge planning. Patient is currently in assisted living, but requests short-term rehab prior to return to [facility]. Patient was previously at [facility] and liked it there. She requests placement at [facility]. Bed request to [facility]. Case Management will continue to follow.     Care Management Interventions  Transition of Care Consult (CM Consult): Discharge Planning  Discharge Durable Medical Equipment: No  Physical Therapy Consult: Yes  Occupational Therapy Consult: Yes  Current Support Network: Assisted Living  Confirm Follow Up Transport: Family  Plan discussed with Pt/Family/Caregiver: Yes  Freedom of Choice Offered: Yes  Discharge Location  Discharge Placement: Rehab Unit Subacute

## 2017-01-12 NOTE — PROGRESS NOTES
Mrs. David Patino states she has not had a bowel movement in several days and she wants a suppository. Dulcolax suppository and PO dulcolax ordered and given per Jessika Tello NP.     Mrs. Elkins tolerated suppository well and is lying on left side at this time.

## 2017-01-12 NOTE — PROGRESS NOTES
Mrs. Knight Organ sitting up in bed eating breakfast tray. Alert, oriented in all spheres. States no dyspnea or discomfort at this time. Scan thick, clear mucous at times. On 2 lpm NC. Lung sounds clear. No edema or skin breakdown noted. Will monitor closely with door open.

## 2017-01-13 NOTE — PROGRESS NOTES
Problem: Self Care Deficits Care Plan (Adult)  Goal: *Acute Goals and Plan of Care (Insert Text)  1. Patient will complete lower body bathing and dressing with setup assist and adaptive equipment as needed. 2. Patient will complete toileting with supervision and no loss of balance. 3. Patient will tolerate 25 minutes of OT treatment with as needed rest breaks to increase activity tolerance for ADLs. 4. Patient will complete functional transfers with supervision and adaptive equipment as needed. 5. Patient will complete grooming tasks after setup in supported sitting at sink level. Timeframe: 7 visits       OCCUPATIONAL THERAPY: Initial Assessment, Treatment Day: Day of Assessment and PM 1/13/2017  INPATIENT: Hospital Day: 4  [place] / Product Type: Medicare /      NAME/AGE/GENDER: Marge Link is a 80 y.o. female      PRIMARY DIAGNOSIS:  resp failure  copd exacerbation  COPD exacerbation ([facility] [address] COPD exacerbation ([facility] [address] COPD exacerbation ([facility] [address]        ICD-10: Treatment Diagnosis:        · Generalized Muscle Weakness (M62.81)  · Other lack of cordination (R27.8)   Precautions/Allergies:        falls risk,  Adderall [dextroamphetamine-amphetamine] and Proair hfa [albuterol sulfate]       ASSESSMENT:      Ms. Shante Lowe presents supine in bed watching TV, agreeable to OT. Up to edge of bed with supervision. Reports she sleeps with her tennis shoes on all the time because she gets up so many times to void at night, that she \"figures I am safer that way than barefoot. I hate to wear socks\". Pt quickly fatigued with all tasks, even talking. Pt appears anxious and talks to fast. Pt on 2L NC oxygen and Oxygen Sats remained > than 90%. CGA to minimal assist to walk around bed to [facility]. Minimal assist for toileting. Returned to edge of bed and completed MMT.  B UE are WFLs for basic self care tasks. Asked to return to supine stating \"I can breathe better lying down\". OT educated pt on how breathing works. Pt independent with bed mobility and was left supine with all needs in reach. Pt is functioning below her independent level and would benefit from skilled OT to maximize independence and activity tolerance with ADLs and mobility. This section established at most recent assessment   PROBLEM LIST (Impairments causing functional limitations):  1. Decreased Strength  2. Decreased ADL/Functional Activities  3. Decreased Transfer Abilities  4. Decreased Ambulation Ability/Technique  5. Decreased Activity Tolerance  6. Decreased Work Simplification/Energy Conservation Techniques  7. Increased Shortness of Breath  8. Decreased [place] with Home Exercise Program    INTERVENTIONS PLANNED: (Benefits and precautions of occupational therapy have been discussed with the patient.)  1. Activities of daily living training  2. Adaptive equipment training  3. Balance training  4. Clothing management  5. Community reintergration  6. Donning&doffing training  7. Group therapy  8. Theraputic activity  9. Theraputic exercise  10. Safety training      TREATMENT PLAN: Frequency/Duration: Follow patient 3x per week to address above goals. Rehabilitation Potential For Stated Goals: GOOD      RECOMMENDED REHABILITATION/EQUIPMENT: (at time of discharge pending progress): Continue Skilled Therapy, Rehab and Discussed with Case Management. OCCUPATIONAL PROFILE AND HISTORY:   History of Present Injury/Illness (Reason for Referral): The patient is an 80year old white female with known underlying COPD GOLD stage IV disease who is admitted from the office with COPD exacerbation and acute on chronic respiratory failure. She is accompanied by her daughter, Juan Knowles. She was last seen in the office on 1/4/17 and was doing relatively well. Over the week-end, she developed worsening shortness of breath and some wheezing. Denies any sick contacts. No fever or chills. No hemoptysis.  She is coughing up some pale yellow secretions. She is at the point that she cannot speak without gasping for air. Past Medical History   Diagnosis Date    Atrial fibrillation ([facility] [address] 2016         one episode while in hospital, on cardizem    Insomnia       Uterine fibroid ? ??          Past Medical History/Comorbidities:   Ms. Eugene Fields  has a past medical history of Atrial fibrillation ([facility] [address] (2016); Insomnia; and Uterine fibroid (???). Ms. Eugene Fields  has a past surgical history that includes tubal ligation (Bilateral, 1971) and carpal tunnel release (Left, 1975). Social History/Living Environment:   Home Environment: Independent living ([place] )  Wheelchair Ramp: Yes  One/Two Story Residence: One story  Living Alone: Yes  Support Systems: Child(trent)  Patient Expects to be Discharged to[de-identified] Unknown  Current DME Used/Available at Home: Grab bars, Hospital bed, Oxygen, portable, Raised toilet seat, Shower chair, Walker, rollator, Wheelchair  Tub or Shower Type: Shower (CG 2 days/week for assist with showering)  Has chair for sitting  Prior Level of Function/Work/Activity:  Lives in [facility] but has hired cargeiver 2x/week to assist with showering and housework, no driving, uses 3W RW at all times. Supportive family. Dominant Side:         RIGHT   Number of Personal Factors/Comorbidities that affect the Plan of Care: Extensive review of physical, cognitive, and psychosocial performance (3+):  HIGH COMPLEXITY   ASSESSMENT OF OCCUPATIONAL PERFORMANCE[de-identified]   Activities of Daily Living:          Toileting, hand washing  Basic ADLs (From Assessment) Complex ADLs (From Assessment)   Basic ADL  Feeding: Setup, Additional time  Oral Facial Hygiene/Grooming: Minimum assistance, Additional time  Bathing: Minimum assistance, Additional time  Upper Body Dressing: Minimum assistance, Additional time  Lower Body Dressing: Minimum assistance, Additional time  Toileting: Minimum assistance, Additional time Instrumental ADL  Meal Preparation: Total assistance (eats all meals in dining room)  Homemaking: Total assistance  Medication Management: Setup (family prepares weekly and pt takes)  Financial Management: Supervision (family assists)   Grooming/Bathing/Dressing Activities of Daily Living     Cognitive Retraining  Safety/Judgement: Awareness of environment; Fall prevention                 Functional Transfers  Toilet Transfer : Minimum assistance  Tub Transfer: Moderate assistance  Shower Transfer: Minimum assistance     Bed/Mat Mobility  Rolling: Stand-by asssistance  Supine to Sit: Contact guard assistance  Sit to Supine: Stand-by asssistance  Sit to Stand: Minimum assistance  Bed to Chair: Minimum assistance  Scooting: Contact guard assistance          Most Recent Physical Functioning:   Gross Assessment:  AROM: Generally decreased, functional  Strength: Generally decreased, functional  Coordination: Within functional limits  Sensation: Intact               Posture:  Posture (WDL): Exceptions to WDL  Posture Assessment:  Forward head, Rounded shoulders  Balance:  Sitting: Intact  Standing: Impaired  Standing - Static: Good  Standing - Dynamic : Fair Bed Mobility:  Rolling: Stand-by asssistance  Supine to Sit: Contact guard assistance  Sit to Supine: Stand-by asssistance  Scooting: Contact guard assistance  Wheelchair Mobility:     Transfers:  Sit to Stand: Minimum assistance  Stand to Sit: Contact guard assistance  Bed to Chair: Minimum assistance                 Patient Vitals for the past 6 hrs:       BP SpO2 O2 Flow Rate (L/min) Pulse   01/13/17 1057 139/74 95 % - 92   01/13/17 1136 - 97 % 2 l/min -        Mental Status  Neurologic State: Alert  Orientation Level: Appropriate for age, Oriented X4  Cognition: Appropriate for age attention/concentration, Follows commands  Perception: Appears intact  Perseveration: No perseveration noted  Safety/Judgement: Awareness of environment, Fall prevention Physical Skills Involved:  1. Balance  2. Mobility  3. Strength  4. Endurance Cognitive Skills Affected (resulting in the inability to perform in a timely and safe manner):  1. Attending  2. Understanding  3. Learning  4. Remembering Psychosocial Skills Affected:  1. Interpersonal Interactions  2. Habits  3. Routines and Behaviors  4. Active Use of Coping Strategies  5. Environmental Adaptations   Number of elements that affect the Plan of Care: 5+:  HIGH COMPLEXITY   CLINICAL DECISION MAKIN [facility] Mobility Inpatient Short Form  How much help from another person does the patient currently need. .. Total A Lot A Little None   1. Putting on and taking off regular lower body clothing?   [ ] 1   [ ] 2   [X] 3   [ ] 4   2. Bathing (including washing, rinsing, drying)? [ ] 1   [ ] 2   [X] 3   [ ] 4   3. Toileting, which includes using toilet, bedpan or urinal?   [ ] 1   [ ] 2   [X] 3   [ ] 4   4. Putting on and taking off regular upper body clothing?   [ ] 1   [ ] 2   [X] 3   [ ] 4   5. Taking care of personal grooming such as brushing teeth? [ ] 1   [ ] 2   [X] 3   [ ] 4   6. Eating meals? [ ] 1   [ ] 2   [ ] 3   [X] 4   © , Trustees of [address] [place], [place] [id number], under license to [employer]. All rights reserved    Score:  Initial: 19, completed 2017 Most Recent: X (Date: -- )     Interpretation of Tool:  Represents activities that are increasingly more difficult (i.e. Bed mobility, Transfers, Gait).        Score 24 23 22-20 19-15 14-10 9-7 6       Modifier CH CI CJ CK CL CM CN         · Self Care:               - CURRENT STATUS:    CK - 40%-59% impaired, limited or restricted               - GOAL STATUS:           CI - 1%-19% impaired, limited or restricted               - D/C STATUS:                       ---------------To be determined---------------  [place] / Product Type: Medicare /       Medical Necessity:     · Patient demonstrates good rehab potential due to higher previous functional level. Reason for Services/Other Comments:  · Patient continues to require skilled intervention due to s/p above and decreased ADLs and mobility. Use of outcome tool(s) and clinical judgement create a POC that gives a: MODERATE COMPLEXITY             TREATMENT:   (In addition to Assessment/Re-Assessment sessions the following treatments were rendered)      Pre-treatment Symptoms/Complaints:  \"I keep my shoes on all the time because I have to get up and pee so much. \"  Pain: Initial:   Pain Intensity 1: 0 (no complaint of pain during OT session)  Post Session:  0, just short of breath      Self Care: (8 mins): Procedure(s) (per grid) utilized to improve and/or restore self-care/home management as related to dressing, toileting, grooming and self feeding. Required minimal visual, verbal, tactile and   cueing to facilitate activities of daily living skills and compensatory activities. Evaluation: 12 mins     Treatment/Session Assessment:  Up to [facility] for toileting then back to bed       Response to Treatment:  Fatigued quickly, recovered slowly. Interdisciplinary Collaboration:   · Occupational Therapist  · Registered Nurse  ·   · Certified Nursing Assistant/Patient Care Technician     After treatment position/precautions:   · Supine in bed  · Bed/Chair-wheels locked  · Bed in low position  · Call light within reach  · RN notified  · Side rails x 3     Compliance with Program/Exercises: Will assess as treatment progresses. Recommendations/Intent for next treatment session:   \"Next visit will focus on advancements to more challenging activities and reduction in assistance provided\"       Total Treatment Duration: 20 mins  OT Patient Time In/Time Out  Time In: 1340  Time Out: [address] [facility], OT  Erendira Robles MS, OTR/L

## 2017-01-13 NOTE — PROGRESS NOTES
Bedside report given to [facility], RN Pt. Is alert and oriented with no signs of distress or complaints of pain at this time.

## 2017-01-13 NOTE — PROGRESS NOTES
Problem: Mobility Impaired (Adult and Pediatric)  Goal: *Acute Goals and Plan of Care (Insert Text)  LTG:  (1.)Ms. Isidro Pacheco will move from supine to sit and sit to supine , scoot up and down and roll side to side with MODIFIED INDEPENDENCE within 7 day(s). (2.)Ms. Isidro Pacheco will transfer from bed to chair and chair to bed with MODIFIED INDEPENDENCE using the least restrictive device within 7 day(s). (3.)Ms. Isidro Pacheco will ambulate with MODIFIED INDEPENDENCE for 150+ feet with the least restrictive device within 7 day(s), O2 stat >90%. _____________________________________________________________________________________________      PHYSICAL THERAPY: Daily Note, Treatment Day: 1st and AM 1/13/2017  INPATIENT: Hospital Day: 4  [place] / Product Type: Medicare /      NAME/AGE/GENDER: Diogenes Fernandez is a 80 y.o. female            PRIMARY DIAGNOSIS: resp failure  copd exacerbation  COPD exacerbation ([facility] [address] COPD exacerbation ([facility] [address] COPD exacerbation ([facility] [address]        ICD-10: Treatment Diagnosis: Generalized Muscle Weakness (M62.81)  Difficulty in walking, Not elsewhere classified (R26.2)  Precautions/Allergies: Adderall [dextroamphetamine-amphetamine] and Proair hfa [albuterol sulfate]       ASSESSMENT:      Ms. Isidro Pacheco presents sleeping in bed. She is willing to get up and sit up for lunch. She appears anxious in general.  She sat up without assistance. She stood into her rollator and took a few steps to the commode, cleaned herself up and walked a few feet to the recliner. She did not think she had the strength to wash her hands at the sink. She performed seated exercises below. Minimal progress. Good mobility but poor endurance. This section established at most recent assessment   PROBLEM LIST (Impairments causing functional limitations):  1. Decreased ADL/Functional Activities  2. Decreased Transfer Abilities  3.  Decreased Ambulation Ability/Technique  4. Decreased Balance  5. Decreased Activity Tolerance  6. Increased Fatigue  7. Increased Shortness of Breath    INTERVENTIONS PLANNED: (Benefits and precautions of physical therapy have been discussed with the patient.)  1. Balance Exercise  2. Bed Mobility  3. Family Education  4. Gait Training  5. Home Exercise Program (HEP)  6. Therapeutic Activites  7. Therapeutic Exercise/Strengthening  8. Transfer Training  9. Group Therapy      TREATMENT PLAN: Frequency/Duration: 3 times a week for duration of hospital stay  Rehabilitation Potential For Stated Goals: GOOD      RECOMMENDED REHABILITATION/EQUIPMENT: (at time of discharge pending progress): Continue Skilled Therapy and and rehab pending progress. HISTORY:   History of Present Injury/Illness (Reason for Referral): The patient is an 80year old white female with known underlying COPD GOLD stage IV disease who is admitted from the office with COPD exacerbation and acute on chronic respiratory failure. She is accompanied by her daughter, Patricia Mercado. She was last seen in the office on 1/4/17 and was doing relatively well. Over the week-end, she developed worsening shortness of breath and some wheezing. Denies any sick contacts. No fever or chills. No hemoptysis. She is coughing up some pale yellow secretions. She is at the point that she cannot speak without gasping for air. Past Medical History/Comorbidities:   Ms. Amelie Ramirez  has a past medical history of Atrial fibrillation ([facility] [address] (2016); Insomnia; and Uterine fibroid (???). Ms. Amelie Ramirez  has a past surgical history that includes tubal ligation (Bilateral, 1971) and carpal tunnel release (Left, 1975).   Social History/Living Environment:   Home Environment: Independent living ([place] )  Wheelchair Ramp: Yes  One/Two Story Residence: One story  Living Alone: Yes  Support Systems: Child(trent)  Patient Expects to be Discharged to[de-identified] Unknown  Current DME Used/Available at Home: Grab bars, Hospital bed, Oxygen, portable, Raised toilet seat, Shower chair, Walker, rollator, Wheelchair  Tub or Shower Type: Shower (CG 2 days/week for assist with showering)  Prior Level of Function/Work/Activity:  Lives in independent living; uses 3 wheeled rolling walker, on home O2, assist with bathing; independent with dressing  Current Medications:           Current Facility-Administered Medications:     [START ON 1/14/2017] predniSONE (DELTASONE) tablet 40 mg, 40 mg, Oral, DAILY WITH BREAKFAST, Adelso Ocampo MD    [START ON 1/14/2017] levoFLOXacin (LEVAQUIN) tablet 750 mg, 750 mg, Oral, Q48H, Felice Roper MD    traZODone (DESYREL) tablet 50 mg, 50 mg, Oral, QHS, Felice Roper MD, 50 mg at 01/12/17 2100    temazepam (RESTORIL) capsule 15 mg, 15 mg, Oral, QHS PRN, Loyda Dsouza MD    bisacodyl (DULCOLAX) suppository 10 mg, 10 mg, Rectal, DAILY PRN, Dianna Husbands, NP, 10 mg at 01/12/17 1429    bisacodyl (DULCOLAX) tablet 5 mg, 5 mg, Oral, DAILY PRN, Dianna Husbands, NP, 5 mg at 01/12/17 1429    buPROPion (WELLBUTRIN) tablet 75 mg, 75 mg, Oral, DAILY, Adelso Ocampo MD, 75 mg at 01/13/17 0842    dilTIAZem CD (CARDIZEM CD) capsule 240 mg, 240 mg, Oral, DAILY, Adelso Ocampo MD, 240 mg at 01/13/17 0842    guaiFENesin SR (MUCINEX) tablet 600 mg, 600 mg, Oral, BID, Adelso Ocampo MD, 600 mg at 01/13/17 0842    hydroCHLOROthiazide (HYDRODIURIL) tablet 25 mg, 25 mg, Oral, DAILY, Adelso Ocampo MD, 25 mg at 01/13/17 0842    latanoprost (XALATAN) 0.005 % ophthalmic solution 1 Drop, 1 Drop, Both Eyes, QHS, Adelso Ocampo MD, 1 Drop at 01/12/17 2011    lisinopril (PRINIVIL, ZESTRIL) tablet 40 mg, 40 mg, Oral, DAILY, Felice Roper MD, 40 mg at 01/13/17 0842    multivitamin, tx-iron-ca-min (THERA-M w/ IRON) tablet 1 Tab, 1 Tab, Oral, DAILY, Adelso Ocampo MD, 1 Tab at 01/13/17 0842    raloxifene (EVISTA) tablet 60 mg (patient supplied), 60 mg, Oral, DAILY, Felice Roper, MD, Stopped at 01/11/17 0900    venlafaxine (EFFEXOR) tablet 37.5 mg, 37.5 mg, Oral, BID, Nita Osman MD, 37.5 mg at 01/13/17 0843    sodium chloride (NS) flush 5-10 mL, 5-10 mL, IntraVENous, Q8H, Felice Mcclain MD, 10 mL at 01/13/17 0605    sodium chloride (NS) flush 5-10 mL, 5-10 mL, IntraVENous, PRN, Nita Osman MD    enoxaparin (LOVENOX) injection 30 mg, 30 mg, SubCUTAneous, Q24H, Felice Mcclain MD, 30 mg at 01/12/17 1718    budesonide (PULMICORT) 500 mcg/2 ml nebulizer suspension, 500 mcg, Nebulization, BID RT, Nita Osman MD, 500 mcg at 01/13/17 7646    ipratropium (ATROVENT) 0.02 % nebulizer solution 0.5 mg, 0.5 mg, Nebulization, Q4H RT, Nita Osman MD, 0.5 mg at 01/13/17 1136    levalbuterol (Durenda Soto) nebulizer soln 0.63 mg/3 mL, 0.63 mg, Nebulization, Q4H RT, Nita Osman MD, 0.63 mg at 01/13/17 1136   Date Last Reviewed:     Number of Personal Factors/Comorbidities that affect the Plan of Care:  COPD, A Fib 1-2: MODERATE COMPLEXITY   EXAMINATION:   Most Recent Physical Functioning:   Gross Assessment:                  Posture:     Balance:    Bed Mobility:  Supine to Sit: Modified independent  Scooting: Independent  Wheelchair Mobility:     Transfers:  Sit to Stand: Contact guard assistance  Stand to Sit: Contact guard assistance  Gait:     Gait Abnormalities: Decreased step clearance  Distance (ft): 5 Feet (ft) (bed to commode and commode to chair)  Assistive Device: Walker, rollator  Ambulation - Level of Assistance: Contact guard assistance       Body Structures Involved:  1. Lungs  2. Joints  3. Muscles Body Functions Affected:  1. Respiratory  2. Movement Related Activities and Participation Affected:  1. General Tasks and Demands  2. Mobility  3.  Self Care   Number of elements that affect the Plan of Care: 4+: HIGH COMPLEXITY   CLINICAL PRESENTATION:   Presentation: Evolving clinical presentation with changing clinical characteristics: MODERATE COMPLEXITY CLINICAL DECISION MAKING:   Emeka Garcia AM-PAC 6 Clicks   Basic Mobility Inpatient Short Form  How much difficulty does the patient currently have. .. Unable A Lot A Little None   1. Turning over in bed (including adjusting bedclothes, sheets and blankets)? [ ] 1   [ ] 2   [X] 3   [ ] 4   2. Sitting down on and standing up from a chair with arms ( e.g., wheelchair, bedside commode, etc.)   [ ] 1   [ ] 2   [X] 3   [ ] 4   3. Moving from lying on back to sitting on the side of the bed? [ ] 1   [ ] 2   [X] 3   [ ] 4   How much help from another person does the patient currently need. .. Total A Lot A Little None   4. Moving to and from a bed to a chair (including a wheelchair)? [ ] 1   [ ] 2   [X] 3   [ ] 4   5. Need to walk in hospital room? [ ] 1   [ ] 2   [X] 3   [ ] 4   6. Climbing 3-5 steps with a railing? [ ] 1   [X] 2   [ ] 3   [ ] 4   © 2007, Trustees of [employer], under license to [employer]. All rights reserved    Score:  Initial: 17 Most Recent: X (Date: -- )     Interpretation of Tool:  Represents activities that are increasingly more difficult (i.e. Bed mobility, Transfers, Gait). Score 24 23 22-20 19-15 14-10 9-7 6       Modifier CH CI CJ CK CL CM CN         · Mobility - Walking and Moving Around:               - CURRENT STATUS:    CK - 40%-59% impaired, limited or restricted               - GOAL STATUS:           CK - 40%-59% impaired, limited or restricted               - D/C STATUS:                       ---------------To be determined---------------  [place] / Product Type: Medicare /       Medical Necessity:     · Patient is expected to demonstrate progress in strength, range of motion, balance and coordination to decrease assistance required with overall functional mobility, transfers, ambulation. · Patient demonstrates good rehab potential due to higher previous functional level.   Reason for Services/Other Comments:  · Patient continues to require present interventions due to patient's inability to perform bed mobility, transfers, ambulation all safely and effectively at prior level of function of mod I.   Use of outcome tool(s) and clinical judgement create a POC that gives a: Questionable prediction of patient's progress: MODERATE COMPLEXITY                 TREATMENT:   (In addition to Assessment/Re-Assessment sessions the following treatments were rendered)   Pre-treatment Symptoms/Complaints:  2 L/min O2; fatigued  Pain: Initial:   Pain Intensity 1: 0  Post Session:  0      Therapeutic Exercise: (15 Minutes):  Exercises per grid below to improve mobility, strength, balance and endurance. Required minimal visual and verbal cues to promote proper body mechanics. Progressed complexity of movement as indicated. Date:  1/13/17 Date:   Date:     Activity/Exercise Parameters Parameters Parameters   ambulation 5 ft     Seated TKE 15x B     Seated marching 15x B     Seated hip abd 15x B     Seated heel/toe raises 15x B                        Treatment/Session Assessment:    · Response to Treatment:  Fatigued  · Interdisciplinary Collaboration:  · Physical Therapy Assistant and Registered Nurse  · After treatment position/precautions:  · Supine in bed, Bed/Chair-wheels locked, Bed in low position, Call light within reach, RN notified, Family at bedside and Side rails x 2  · Compliance with Program/Exercises: Will assess as treatment progresses. · Recommendations/Intent for next treatment session: \"Next visit will focus on advancements to more challenging activities\".   Total Treatment Duration:  PT Patient Time In/Time Out  Time In: 1125  Time Out: Ellis [address], PTA

## 2017-01-13 NOTE — PROGRESS NOTES
Mrs. Dasia Penaloza resting in bed on left side with eyes closed. Eyes open to voice. States no dyspnea at this time. Better appetite this evening. Remains on 2 lpm NC. Without need at this time. Call light in lap and door open.

## 2017-01-13 NOTE — PROGRESS NOTES
Mrs. Minerva Madison resting in bed quietly. Alert, oriented in all spheres. States she does not fel well today. Poor appetite; did not eat breakfast tray. States she feels short of breath. O2 sat found to be 98% on 2 lpm NC. Does appear to have dyspnea but with respiratory rate of 20. HOB elevated at this time. Lung sounds clear with congested, nonproductive cough. Will monitor closely with door open and call light in lap.

## 2017-01-13 NOTE — PROGRESS NOTES
Selwyn Nayak  Admission Date: 1/10/2017             Daily Progress Note: 1/13/2017    The patient's chart is reviewed and the patient is discussed with the staff. 81 yo with known underlying COPD GOLD stage IV disease was admitted from the office with COPD exacerbation and acute on chronic respiratory failure. Over the week-end, she developed worsening shortness of breath, some wheezing and with pale yellow secretions. Subjective:     Patient resting in bed, conversant, states that she doesn't feel well because she didn't sleep much last night. Does report working with physical therapy yesterday but states she didn't do as much as she wished.      Current Facility-Administered Medications   Medication Dose Route Frequency    traZODone (DESYREL) tablet 50 mg  50 mg Oral QHS    temazepam (RESTORIL) capsule 15 mg  15 mg Oral QHS PRN    bisacodyl (DULCOLAX) suppository 10 mg  10 mg Rectal DAILY PRN    bisacodyl (DULCOLAX) tablet 5 mg  5 mg Oral DAILY PRN    methylPREDNISolone (PF) (SOLU-MEDROL) injection 40 mg  40 mg IntraVENous Q12H    buPROPion (WELLBUTRIN) tablet 75 mg  75 mg Oral DAILY    dilTIAZem CD (CARDIZEM CD) capsule 240 mg  240 mg Oral DAILY    guaiFENesin SR (MUCINEX) tablet 600 mg  600 mg Oral BID    hydroCHLOROthiazide (HYDRODIURIL) tablet 25 mg  25 mg Oral DAILY    latanoprost (XALATAN) 0.005 % ophthalmic solution 1 Drop  1 Drop Both Eyes QHS    lisinopril (PRINIVIL, ZESTRIL) tablet 40 mg  40 mg Oral DAILY    multivitamin, tx-iron-ca-min (THERA-M w/ IRON) tablet 1 Tab  1 Tab Oral DAILY    raloxifene (EVISTA) tablet 60 mg (patient supplied)  60 mg Oral DAILY    venlafaxine (EFFEXOR) tablet 37.5 mg  37.5 mg Oral BID    sodium chloride (NS) flush 5-10 mL  5-10 mL IntraVENous Q8H    sodium chloride (NS) flush 5-10 mL  5-10 mL IntraVENous PRN    enoxaparin (LOVENOX) injection 30 mg  30 mg SubCUTAneous Q24H    budesonide (PULMICORT) 500 mcg/2 ml nebulizer suspension  500 mcg Nebulization BID RT    ipratropium (ATROVENT) 0.02 % nebulizer solution 0.5 mg  0.5 mg Nebulization Q4H RT    levalbuterol (XOPENEX) nebulizer soln 0.63 mg/3 mL  0.63 mg Nebulization Q4H RT    levoFLOXacin (LEVAQUIN) 750 mg in D5W IVPB  750 mg IntraVENous Q48H       Review of Systems    Constitutional: negative for fever, chills, sweats  Cardiovascular: negative for chest pain, palpitations, syncope, edema  Gastrointestinal:  negative for dysphagia, reflux, vomiting, diarrhea, abdominal pain, or melena, small BM yesterday  Neurologic:  negative for focal weakness, numbness, headache    Objective:     Vitals:    01/13/17 0007 01/13/17 0353 01/13/17 0356 01/13/17 0728   BP: 103/51  129/60 158/68   Pulse: 99  100 87   Resp: 20  18 20   Temp: 97.7 °F (36.5 °C)  98.1 °F (36.7 °C) 98.1 °F (36.7 °C)   SpO2: 93% 96% 96% 92%   Weight:         Intake and Output:   01/11 1901 - 01/13 0700  In: 360 [P.O.:360]  Out: 1200 [Urine:1200]       Physical Exam:   Constitution:  the patient is elderly, thin and in no acute distress, on 2L O2 (home regimen)   EENMT:  Sclera clear, pupils equal, oral mucosa moist  Respiratory: few crackles posterior, no wheezing    Cardiovascular:  RRR without M,G,R  Gastrointestinal: soft and non-tender; with positive bowel sounds. Musculoskeletal: warm without cyanosis. There is no lower leg edema. Skin:  no jaundice or rashes, no wounds   Neurologic: no gross neuro deficits     Psychiatric:  alert and oriented x 3    CHEST XRAY: none today  CHEST XRAY 1/10/17:    Impressions: Stable portable chest      LAB  No results for input(s): GLUCPOC in the last 72 hours.     No lab exists for component: [place]   Recent Labs      01/10/17   1655   WBC  9.2   HGB  14.4   HCT  42.8   PLT  240     Recent Labs      01/12/17   0639  01/10/17   1655   NA  142  143   K  3.0*  3.1*   CL  98  95*   CO2  36*  40*   GLU  137*  122*   BUN  21  19   CREA  0.88  1.14*   MG   --   2.1   CA  9.1  9.6 No results for input(s): PH, PCO2, PO2, HCO3 in the last 72 hours. No results for input(s): LCAD, LAC in the last 72 hours. Assessment:  (Medical Decision Making)     Hospital Problems  Date Reviewed: 1/13/2017          Codes Class Noted POA    DNR (do not resuscitate) ICD-10-CM: Z66  ICD-9-CM: V49.86  1/11/2017 Yes    Unchanged    Personal history of tobacco use (Chronic) ICD-10-CM: I50.188  ICD-9-CM: V15.82  1/10/2017 Yes    Chronic     * (Principal)COPD exacerbation ([facility] ICD-10-CM: J44.1  ICD-9-CM: 491.21  1/10/2017 Yes    No wheezing, wean steroids     Acute on chronic respiratory failure with hypoxemia ([facility] ICD-10-CM: J96.21  ICD-9-CM: 518.84  1/10/2017 Yes    On 2L, O2 sat 92%     Debility ICD-10-CM: R53.81  ICD-9-CM: 799.3  1/10/2017 Yes    PT, planning for STR at discharge       Dyspnea:  Due to above. Very poorly controlled presently. Add roxanol    Plan:  (Medical Decision Making)     --Pulmicort, Atrovent, Xopenex, Mucinex,   --Levaquin day 4  --Blood culture--pending, no growth 2 days   --Wean steroids to prednisone 40mg po q day   --Will replace potassium, follow up labs tomorrow   --Will obtain qualifying ambulating oxygen sats prior to discharge   --Planning for [facility] with [facility] upon discharge       More than 50% of the time documented was spent in face-to-face contact with the patient and in the care of the patient on the floor/unit where the patient is located. [facility], NP  I have spoken with and examined the patient. I agree with the above assessment and plan as documented. Patient still has poorly controlled dyspnea. Will try low dose roxanol and continue therapy for COPD exacerbation. Gen: pleasant but dyspneic and anxious  Lungs:  Decreased bilaterally  Heart:  RRR with no Murmur/Rubs/Gallops  Ext: no edema    --continue levaquin  --continue bronchodilators  --prednisone 40mg  --try dose of roxanol 3mg for dyspnea as test dose.   May need symptomatic treatment prn.    Lesley Miller MD

## 2017-01-13 NOTE — PROGRESS NOTES
No bed is available at [facility] where the patient wanted to go at discharge. Spoke with her about bed options, and patient has agreed to request placement at [facility] when medically stable. Referral forwarded to [facility] for possible placement.

## 2017-01-13 NOTE — DISCHARGE SUMMARY
Discharge Note    Angela Lamb  Admission date:  1/10/2017  Discharge date:  Anticipated     Admitting Diagnosis:  resp failure;copd exacerbation;COPD exacerbation [facility])    Discharge Diagnoses:   Hospital Problems  Date Reviewed: 1/13/2017          Codes Class Noted POA    DNR (do not resuscitate) ICD-10-CM: Z66  ICD-9-CM: V49.86  1/11/2017 Yes        Personal history of tobacco use (Chronic) ICD-10-CM: K14.178  ICD-9-CM: V15.82  1/10/2017 Yes        * (Principal)COPD exacerbation ([facility] ICD-10-CM: J44.1  ICD-9-CM: 491.21  1/10/2017 Yes        Acute on chronic respiratory failure with hypoxemia ([facility] ICD-10-CM: J96.21  ICD-9-CM: 518.84  1/10/2017 Yes        Debility ICD-10-CM: R53.81  ICD-9-CM: 799.3  1/10/2017 Yes              Consultants: none     Studies/Procedures:  CXR      Condition on Discharge:  Stable     Disposition:  [facility]       Presenting Illness: 81 yo with known underlying COPD GOLD stage IV disease was admitted from the office with COPD exacerbation and acute on chronic respiratory failure. She was last seen in our office on 1/4/17 and was doing relatively well. Over the week-end, she developed worsening shortness of breath, some wheezing and with pale yellow secretions. Hospital course: Patient was admitted for further medical management and started on supplemental O2 with Optiflow, antibiotics with Levaquin, IV steroids and nebulizer treatments. Potassium 3.1 and was replaced. Patient was living in [facility] prior to admission but feels she cannot resume prior activity level. Physical therapy consulted and recommended STR. Symptoms improved with medical therapy and is now ready for discharge. Will be discharged to STR with Prednisone taper and Levaquin antibiotic course. We will follow up in the office for continued care in 2 weeks.        Physical Exam:   Constitution: the patient is elderly, thin and in no acute distress, on 2L O2  EENMT: Sclera clear, pupils equal, oral mucosa moist  Respiratory: few crackles posterior, no wheezing    Cardiovascular: RRR without M,G,R  Gastrointestinal: soft and non-tender; with positive bowel sounds. Musculoskeletal: warm without cyanosis. There is no lower leg edema. Skin: no jaundice or rashes, no wounds   Neurologic: no gross neuro deficits     Psychiatric: alert and oriented x 3      LAB  No results for input(s): WBC, HGB, HCT, PLT, INR, HGBEXT, HCTEXT, PLTEXT, HGBEXT, HCTEXT, PLTEXT in the last 72 hours. No lab exists for component: [place]  Recent Labs      01/14/17   0527  01/12/17   0639   NA  143  142   K  3.7  3.0*   CL  98  98   CO2  40*  36*   BUN  31*  21   CREA  0.81  0.88   MG  2.3   --      No results for input(s): PH, PCO2, PO2, HCO3 in the last 72 hours. Discharge Medications:   Current Discharge Medication List      START taking these medications    Details   ipratropium (ATROVENT) 0.02 % nebulizer solution 2.5 mL by Nebulization route as needed for Wheezing. Qty: 825 mL, Refills: 0      levoFLOXacin (LEVAQUIN) 750 mg tablet Take 1 Tab by mouth every fourty-eight (48) hours. Qty: 1 Tab, Refills: 0         CONTINUE these medications which have CHANGED    Details   predniSONE (DELTASONE) 20 mg tablet 2 tabs po qd x 3 days, 1 and 1/2 tabs po qd x 3 days, 1 tab po qd x 3 days, 1/2 tab po qd x 3 days, or as directed. Qty: 15 Tab, Refills: 0         CONTINUE these medications which have NOT CHANGED    Details   traZODone (DESYREL) 50 mg tablet Take  by mouth nightly. venlafaxine (EFFEXOR) 37.5 mg tablet Take 1 Tab by mouth two (2) times a day. Qty: 60 Tab, Refills: 3      CRANBERRY FRUIT EXTRACT (CRANBERRY CONCENTRATE PO) Take  by mouth. raloxifene (EVISTA) 60 mg tablet Take 1 Tab by mouth daily. Qty: 90 Tab, Refills: 3      dilTIAZem CD (CARDIZEM CD) 240 mg ER capsule Take 1 Cap by mouth daily. Qty: 90 Cap, Refills: 3      lisinopril (PRINIVIL, ZESTRIL) 40 mg tablet Take 1 Tab by mouth daily.   Qty: 90 Tab, Refills: 3      HYDROcodone-acetaminophen (NORCO) 5-325 mg per tablet Take 1 Tab by mouth every eight (8) hours as needed for Pain. Max Daily Amount: 3 Tabs. Qty: 48 Tab, Refills: 0      buPROPion (WELLBUTRIN) 75 mg tablet Take 1 Tab by mouth daily. Qty: 30 Tab, Refills: 3      ALPRAZolam (XANAX) 0.25 mg tablet Take 1 Tab by mouth daily as needed. Qty: 30 Tab, Refills: 1      hydrochlorothiazide (HYDRODIURIL) 25 mg tablet Take 1 Tab by mouth daily. Qty: 90 Tab, Refills: 3    Associated Diagnoses: Pulmonary emphysema, unspecified emphysema type (HCC)      OXYGEN-AIR DELIVERY SYSTEMS 2 lpm cont. fluticasone-salmeterol (ADVAIR DISKUS) 250-50 mcg/dose diskus inhaler Take 1 Puff by inhalation two (2) times a day. Qty: 1 Inhaler, Refills: 11      latanoprost (XALATAN) 0.005 % ophthalmic solution Administer 1 Drop to both eyes nightly. GUAIFENESIN (MUCINEX PO) Take 600 mg by mouth daily as needed. Unknown dose      calcium-cholecalciferol, d3, (CALCIUM 600 + D) 600-125 mg-unit Tab Take  by mouth two (2) times a day. multivitamin (ONE A DAY) tablet Take 1 Tab by mouth daily. OTHER alteril tablet pt takes 2 prn for sleep      albuterol (PROVENTIL VENTOLIN) 2.5 mg /3 mL (0.083 %) nebulizer solution Pt to use twice daily dx code 496  Qty: 1 Package, Refills: 12      Nebulizer & Compressor machine Pt uses twice daily dx code 496  Qty: 1 Each, Refills: 0         STOP taking these medications       tiotropium (SPIRIVA WITH HANDIHALER) 18 mcg inhalation capsule Comments:   Reason for Stopping: Followup/Outpt Studies:  --Follow up with transitional care appointment with [facility] in 2 weeks with spirometry. --atrovent nebulizer upon discharge (could not afford spiriva inhaler- per Dr. Cheko Bailon)   --Charol Bhaskar for 2 more days to complete an 7-day antibiotic course. --Prednisone taper prescribed as well. --Total discharge greater than 30 minutes in duration.     More than 50% of the time documented was spent in face-to-face contact with the patient and in the care of the patient on the floor/unit where the patient is located.     Kun Hancock MD

## 2017-01-14 NOTE — PROGRESS NOTES
O2 SAT ON ROOM AIR AT REST 88%  ;  O2 SAT AT REST 2 LPM 97%   ;   O2 SAT AT REST 2 LPM CANNULA 94%   ;   O2 SAT WITH ACTIVITY  ROOM AIR 84%

## 2017-01-14 NOTE — PROGRESS NOTES
Patient's family member, Jie Perez, [phone] requested to speak with case management; Sachin Cruz notified

## 2017-01-14 NOTE — PROGRESS NOTES
[place], respiratory, notified \"full oxygen qualifier\" ordered before discharge by Dr Priscila Payton

## 2017-01-14 NOTE — PROGRESS NOTES
Discharge to [facility] for rehab. IV removed with cath tip intact and pressure dressing applied. Had no home meds or valuables to return. Report being called to Joya Patel. Ambulance transport here now.

## 2017-01-14 NOTE — PROGRESS NOTES
Bedside report received. Sleeping with no distress noted on 2L N/C. Rails up and call button within reach.

## 2017-01-14 NOTE — PROGRESS NOTES
Pt. Alert and Oriented respirations are even and unlabored with no signs of distress, pt has no complaints of pain at this time.

## 2017-01-14 NOTE — PROGRESS NOTES
O2 SAT ON ROOM AIR AT REST 88%   ;   O2 SAT ON ROOM AIR WITH ACTIVITY 83%                                                                                                                O2 SAT AT REST ON 2 LPM CANNULA 97%    ;    O2 SAT WITH ACTIVITY 2 LPLM 94%

## 2017-01-15 NOTE — PROGRESS NOTES
Pt D/C for transfer to [facility] today via [employer]. Family at bedside with pt at time of transfer.

## 2017-01-16 NOTE — PROGRESS NOTES
Patient discharged to [facility]  (SNF) 01/14/2017, follow up call in 21 days, post acute discharge to home.  Patient ineligible for [facility].

## 2017-02-02 NOTE — PROGRESS NOTES
CM and daughter discussed indications for appointment with pulmonary on next week. CM and daughter discussed POA, and daughter is aware that independent living is not a viable option for the patient anymore. CM and daughter discussed role of SNF and expectations. CM encouraged daughter to call CM with needs, concerns, or questions. This note will not be viewable in [address]

## 2017-02-09 NOTE — PROGRESS NOTES
CM reviewed record, CM attempted outreach, no answer, CM left vm      This note will not be viewable in MyChart.

## 2017-02-14 NOTE — PROGRESS NOTES
CM reviewed record, CM attempted outreach, no answer        This note will not be viewable in [address]

## 2017-02-20 NOTE — PROGRESS NOTES
CM reviewed record, CM attempted outreach, no answer, CM left     No further outreaches being scheduled at this time. This note will not be viewable in [address]